# Patient Record
Sex: FEMALE | Race: OTHER | HISPANIC OR LATINO | Employment: UNEMPLOYED | URBAN - METROPOLITAN AREA
[De-identification: names, ages, dates, MRNs, and addresses within clinical notes are randomized per-mention and may not be internally consistent; named-entity substitution may affect disease eponyms.]

---

## 2017-09-11 ENCOUNTER — HISTORICAL (OUTPATIENT)
Dept: RADIOLOGY | Facility: HOSPITAL | Age: 42
End: 2017-09-11

## 2024-02-28 DIAGNOSIS — N85.8 UTERINE MASS: Primary | ICD-10-CM

## 2024-03-10 ENCOUNTER — TELEPHONE (OUTPATIENT)
Dept: GYNECOLOGY | Facility: CLINIC | Age: 49
End: 2024-03-10

## 2024-03-10 DIAGNOSIS — N93.9 ABNORMAL UTERINE BLEEDING (AUB): Primary | ICD-10-CM

## 2024-03-10 RX ORDER — FERROUS GLUCONATE 324(38)MG
324 TABLET ORAL
Qty: 30 TABLET | Refills: 11 | Status: ON HOLD | OUTPATIENT
Start: 2024-03-10 | End: 2024-06-13

## 2024-03-10 RX ORDER — MEDROXYPROGESTERONE ACETATE 10 MG/1
20 TABLET ORAL DAILY
Qty: 60 TABLET | Refills: 11 | Status: ON HOLD | OUTPATIENT
Start: 2024-03-10 | End: 2024-06-13

## 2024-03-10 NOTE — TELEPHONE ENCOUNTER
Patient with h/o menorrhagia. Desires definitive surgical management.  Pateint needs an EMB and pap smear prior to proceeding.  Will request images.  Referral notable for h/H 6.6/24. Pateint reports continued fatigue, denies any SOB, weakness or dizziness at this time. Discussed will start on PO iron. Will get labs to assess for anemia.   Request for visit for follow up sent  Bernarda Ayala MD PGY3  Obstetrics & Gynecology

## 2024-03-11 ENCOUNTER — TELEPHONE (OUTPATIENT)
Dept: GYNECOLOGY | Facility: CLINIC | Age: 49
End: 2024-03-11

## 2024-03-11 NOTE — TELEPHONE ENCOUNTER
----- Message from Bernarda Ayala MD sent at 3/10/2024  4:05 PM CDT -----  Regarding: Planning for hysterectomy  Hello,  I see this patient was referred for fibroids and has anemia.  Can we request her imaging for surgical planning.    Can we get this patient into clinic in the next 1-2 weeks for EMB and pap smear results. She is getting set up for hysterectomy.  Bernarda Ayala MD PGY3  Obstetrics & Gynecology

## 2024-03-12 ENCOUNTER — TELEPHONE (OUTPATIENT)
Dept: GYNECOLOGY | Facility: CLINIC | Age: 49
End: 2024-03-12

## 2024-03-12 NOTE — TELEPHONE ENCOUNTER
I have requested imaging from [facility] on 3/11/24.          ----- Message from Bernarda Ayala MD sent at 3/10/2024  4:05 PM CDT -----  Regarding: Planning for hysterectomy  Hello,  I see this patient was referred for fibroids and has anemia.  Can we request her imaging for surgical planning.    Can we get this patient into clinic in the next 1-2 weeks for EMB and pap smear results. She is getting set up for hysterectomy.  Bernarda Ayala MD PGY3  Obstetrics & Gynecology

## 2024-03-12 NOTE — TELEPHONE ENCOUNTER
She needs EMB and PAP in 1-2 weeks.          ----- Message from Bernarda Ayala MD sent at 3/10/2024  4:05 PM CDT -----  Regarding: Planning for hysterectomy  Hello,  I see this patient was referred for fibroids and has anemia.  Can we request her imaging for surgical planning.    Can we get this patient into clinic in the next 1-2 weeks for EMB and pap smear results. She is getting set up for hysterectomy.  Bernarda Ayala MD PGY3  Obstetrics & Gynecology

## 2024-03-18 ENCOUNTER — OFFICE VISIT (OUTPATIENT)
Dept: GYNECOLOGY | Facility: CLINIC | Age: 49
End: 2024-03-18

## 2024-03-18 ENCOUNTER — LAB VISIT (OUTPATIENT)
Dept: LAB | Facility: HOSPITAL | Age: 49
End: 2024-03-18

## 2024-03-18 VITALS
HEIGHT: 60 IN | WEIGHT: 186.81 LBS | OXYGEN SATURATION: 99 % | HEART RATE: 77 BPM | DIASTOLIC BLOOD PRESSURE: 71 MMHG | BODY MASS INDEX: 36.68 KG/M2 | RESPIRATION RATE: 18 BRPM | SYSTOLIC BLOOD PRESSURE: 115 MMHG | TEMPERATURE: 98 F

## 2024-03-18 DIAGNOSIS — Z11.3 ROUTINE SCREENING FOR STI (SEXUALLY TRANSMITTED INFECTION): Primary | ICD-10-CM

## 2024-03-18 DIAGNOSIS — Z11.3 ROUTINE SCREENING FOR STI (SEXUALLY TRANSMITTED INFECTION): ICD-10-CM

## 2024-03-18 DIAGNOSIS — R19.00 PELVIC MASS: ICD-10-CM

## 2024-03-18 DIAGNOSIS — Z12.4 CERVICAL CANCER SCREENING: ICD-10-CM

## 2024-03-18 DIAGNOSIS — N85.8 UTERINE MASS: ICD-10-CM

## 2024-03-18 DIAGNOSIS — N93.9 ABNORMAL UTERINE BLEEDING (AUB): ICD-10-CM

## 2024-03-18 LAB
ANISOCYTOSIS BLD QL SMEAR: ABNORMAL
B-HCG UR QL: NEGATIVE
BASOPHILS # BLD AUTO: 0.04 X10(3)/MCL
BASOPHILS NFR BLD AUTO: 0.5 %
C TRACH DNA SPEC QL NAA+PROBE: NOT DETECTED
CTP QC/QA: YES
EOSINOPHIL # BLD AUTO: 0.17 X10(3)/MCL (ref 0–0.9)
EOSINOPHIL NFR BLD AUTO: 2.3 %
ERYTHROCYTE [DISTWIDTH] IN BLOOD BY AUTOMATED COUNT: 23.7 % (ref 11.5–17)
HCT VFR BLD AUTO: 23.7 % (ref 37–47)
HGB BLD-MCNC: 6.4 G/DL (ref 12–16)
HIV 1+2 AB+HIV1 P24 AG SERPL QL IA: NONREACTIVE
HYPOCHROMIA BLD QL SMEAR: ABNORMAL
IMM GRANULOCYTES # BLD AUTO: 0.04 X10(3)/MCL (ref 0–0.04)
IMM GRANULOCYTES NFR BLD AUTO: 0.5 %
LYMPHOCYTES # BLD AUTO: 2.36 X10(3)/MCL (ref 0.6–4.6)
LYMPHOCYTES NFR BLD AUTO: 31.6 %
MACROCYTES BLD QL SMEAR: SLIGHT
MCH RBC QN AUTO: 18 PG (ref 27–31)
MCHC RBC AUTO-ENTMCNC: 27 G/DL (ref 33–36)
MCV RBC AUTO: 66.6 FL (ref 80–94)
MICROCYTES BLD QL SMEAR: ABNORMAL
MONOCYTES # BLD AUTO: 0.49 X10(3)/MCL (ref 0.1–1.3)
MONOCYTES NFR BLD AUTO: 6.6 %
N GONORRHOEA DNA SPEC QL NAA+PROBE: NOT DETECTED
NEUTROPHILS # BLD AUTO: 4.38 X10(3)/MCL (ref 2.1–9.2)
NEUTROPHILS NFR BLD AUTO: 58.5 %
NRBC BLD AUTO-RTO: 0.3 %
PLATELET # BLD AUTO: 269 X10(3)/MCL (ref 130–400)
PLATELET # BLD EST: NORMAL 10*3/UL
PMV BLD AUTO: 9.5 FL (ref 7.4–10.4)
RBC # BLD AUTO: 3.56 X10(6)/MCL (ref 4.2–5.4)
RBC MORPH BLD: ABNORMAL
SOURCE (OHS): NORMAL
T PALLIDUM AB SER QL: NONREACTIVE
TSH SERPL-ACNC: 2.58 UIU/ML (ref 0.35–4.94)
WBC # SPEC AUTO: 7.48 X10(3)/MCL (ref 4.5–11.5)

## 2024-03-18 PROCEDURE — 87389 HIV-1 AG W/HIV-1&-2 AB AG IA: CPT

## 2024-03-18 PROCEDURE — 81025 URINE PREGNANCY TEST: CPT | Mod: PBBFAC

## 2024-03-18 PROCEDURE — 58100 BIOPSY OF UTERUS LINING: CPT | Mod: 53,PBBFAC | Performed by: OBSTETRICS & GYNECOLOGY

## 2024-03-18 PROCEDURE — 85025 COMPLETE CBC W/AUTO DIFF WBC: CPT

## 2024-03-18 PROCEDURE — 84443 ASSAY THYROID STIM HORMONE: CPT

## 2024-03-18 PROCEDURE — 99214 OFFICE O/P EST MOD 30 MIN: CPT | Mod: PBBFAC,25

## 2024-03-18 PROCEDURE — 86780 TREPONEMA PALLIDUM: CPT

## 2024-03-18 PROCEDURE — 88174 CYTOPATH C/V AUTO IN FLUID: CPT

## 2024-03-18 PROCEDURE — 87491 CHLMYD TRACH DNA AMP PROBE: CPT

## 2024-03-18 PROCEDURE — 58100 BIOPSY OF UTERUS LINING: CPT | Mod: PBBFAC

## 2024-03-18 PROCEDURE — 87624 HPV HI-RISK TYP POOLED RSLT: CPT

## 2024-03-18 PROCEDURE — 36415 COLL VENOUS BLD VENIPUNCTURE: CPT

## 2024-03-18 PROCEDURE — 99459 PELVIC EXAMINATION: CPT | Mod: PBBFAC

## 2024-03-18 NOTE — PROGRESS NOTES
[facility] GYNECOLOGY CLINIC NOTE     Eileen Clarke is a 48 y.o.  presenting to GYN clinic for pelvic mass and AUB.     Patient reports two years of pelvic mass.   Was referred from [facility]. CT, CXR, and EKG obtained at that hospital.     Taking PO iron and Provera, no bleeding since initiation of Provera.  Reports menses lasting up to 15 day, requires use of 4-5 pads per day, passing clots and bleeding through clothing.   Denies dysuria, hematuria, pain with BM, constipation, diarrhea. Has BM once per day.   Last sexually active one month ago.   Reports intermittent episodes of dizziness, lightheadedness over past month.  Also reports three days of right sided abdominal pressure at night.     Gynecology  OB History          3    Para   3    Term                AB        Living             SAB        IAB        Ectopic        Multiple        Live Births                     x3  Most recent baby born   Denies hx of STIs  Denies hx of abnormal pap    Past Medical History:   Diagnosis Date    Anemia       History reviewed. No pertinent surgical history.   Current Outpatient Medications   Medication Instructions    ferrous gluconate (FERGON) 324 mg, Oral, With breakfast    medroxyPROGESTERone (PROVERA) 20 mg, Oral, Daily     Social History     Tobacco Use    Smoking status: Never    Smokeless tobacco: Never   Substance Use Topics    Drug use: Never       Review of Systems  Pertinent items noted in HPI.     Objective:     Vitals:    24 0803   BP: 115/71   Pulse: 77   Resp: 18   Temp: 97.6 °F (36.4 °C)   SpO2: 99%   Weight: 84.7 kg (186 lb 12.8 oz)   Height: 5' (1.524 m)     Body mass index is 36.48 kg/m².    Physical Exam:   General: Alert and oriented, in no acute distress  Lungs: Breathing comfortably on room air, no conversational dyspnea  Heart: RRR  Abdomen: Soft, non-distended, non tender to palpation.  Extremities: Atraumatic, non-edematous, no cords or calf tenderness, no  significant calf/ankle edema  External genitalia: Normal female genitalia without lesion, discharge or tenderness. Normal appearing urethral meatus. Normal appearing external anus.  Bimanual Exam:  Uterus 20 cm in size, fundus at umbilicus. Irregular in contour. No cervical motion tenderness. No adnexal fullness/tenderness.  Speculum Exam: Vaginal mucosa pink and moist, without lesion. Scant, white discharge present in vaginal canal. Cervix well visualized, smooth in contour with no masses or lesions. Os normal in appearance without blood or discharge.   Note: RN chaperone present for entirety of exam.    Assessment:       48 y.o.  here for AUB, pelvic mass.     1. Routine screening for STI (sexually transmitted infection)  Chlamydia/GC, PCR    HIV 1/2 Ag/Ab (4th Gen)    SYPHILIS ANTIBODY (WITH REFLEX RPR)      2. Uterine mass  Ambulatory referral/consult to Gynecology      3. Abnormal uterine bleeding (AUB)  POCT Urine Pregnancy    Liquid-Based Pap Smear, Screening Screening      4. Pelvic mass        5. Cervical cancer screening               Plan:         Problem List Items Addressed This Visit          Renal/    Abnormal uterine bleeding (AUB)     - Obtain TSH today  - Obtain CBC today, will consider transfusion if indicated  - EMB attempted today, unable to complete secondary to cervical stenosis  - Continue Provera 20 mg daily         Relevant Orders    POCT Urine Pregnancy (Completed)    Liquid-Based Pap Smear, Screening Screening    Cervical cancer screening     - Pap/HPV testing performed today            ID    Routine screening for STI (sexually transmitted infection) - Primary    Relevant Orders    Chlamydia/GC, PCR (Completed)    HIV 1/2 Ag/Ab (4th Gen) (Completed)    SYPHILIS ANTIBODY (WITH REFLEX RPR) (Completed)       GI    Pelvic mass     - 20 cm pelvic mass noted on exam  - CT obtained at [facility], will obtain images and review, consider surgical management           Other Visit  Diagnoses       Uterine mass                Will obtain CT images from outside hospital, [facility] and discuss possible surgical planning with GYN team, determine follow up at that time.     Discussed patient and plan with Dr. Kimball.     Ethel Suarez MD  [facility], PGY-2

## 2024-03-19 ENCOUNTER — TELEPHONE (OUTPATIENT)
Dept: GYNECOLOGY | Facility: CLINIC | Age: 49
End: 2024-03-19

## 2024-03-19 NOTE — PROCEDURES
Endometrial biopsy    Date/Time: 3/18/2024 8:00 AM    Performed by: Ethel Suarez MD  Authorized by: Elle Kimball MD    Consent:     Consent obtained:  Prior to procedure the appropriate consent was completed and verified    Consent given by:  Patient    Patient questions answered: yes      Patient agrees, verbalizes understanding, and wants to proceed: yes    Indication:     Indications: Other disorder of menstruation and other abnormal bleeding from female genital tract    Pre-procedure:     Pre-procedure timeout performed: yes    Procedure:     Procedure: endometrial biopsy with Pipelle      Cervix cleaned and prepped: yes      A paracervical block was performed: no      Uterus sounded: no      Unable to perform due to: cervical stenosis      Ethel Suarez MD  [facility], PGY-2

## 2024-03-19 NOTE — TELEPHONE ENCOUNTER
I've recruited Melany in radiology to look. She will get back with me. ----- Message from Ethel Suarez MD sent at 3/19/2024  1:36 PM CDT -----  Regarding: FW: Planning for hysterectomy  Hey there,  This patient's imaging read was uploaded, but we are not able to view the actual image. Is there a way we can get ahold of CT from [facility]? If not able, patient is also willing to go  CD and bring it to us in person    Thanks!  Ethel Suarez    ----- Message -----  From: Bernarda Ayala MD  Sent: 3/10/2024   4:52 PM CDT  To: Brie Correa LPN; Cheyanne Varela MD; #  Subject: Planning for hysterectomy                        Hello,  I see this patient was referred for fibroids and has anemia.  Can we request her imaging for surgical planning.    Can we get this patient into clinic in the next 1-2 weeks for EMB and pap smear results. She is getting set up for hysterectomy.  Bernarda Ayala MD PGY3  Obstetrics & Gynecology

## 2024-03-19 NOTE — ASSESSMENT & PLAN NOTE
- 20 cm pelvic mass noted on exam  - CT obtained at [facility], will obtain images and review, consider surgical management

## 2024-03-19 NOTE — ASSESSMENT & PLAN NOTE
- Obtain TSH today  - Obtain CBC today, will consider transfusion if indicated  - EMB attempted today, unable to complete secondary to cervical stenosis  - Continue Provera 20 mg daily

## 2024-03-20 LAB — PSYCHE PATHOLOGY RESULT: NORMAL

## 2024-04-01 ENCOUNTER — TELEPHONE (OUTPATIENT)
Dept: GYNECOLOGY | Facility: CLINIC | Age: 49
End: 2024-04-01

## 2024-04-01 NOTE — TELEPHONE ENCOUNTER
Good Afternoon,     The EKG report,   the report of the CT abd/pelvis and chest XR from [facility] (2/21/24) is now scanned into her .      I did receive the DISC and it is currently being uploaded into her chart.

## 2024-04-16 ENCOUNTER — TELEPHONE (OUTPATIENT)
Dept: GYNECOLOGY | Facility: CLINIC | Age: 49
End: 2024-04-16

## 2024-04-16 NOTE — TELEPHONE ENCOUNTER
Patient's daughter called back. Previous EMB attempted on 3/18/24, unsuccessful. Scheduled patient for a follow up visit on 5/8/24 at 1:45 pm. Patient daughter verbalized understanding and will make sure they attend the appt.

## 2024-04-16 NOTE — TELEPHONE ENCOUNTER
Used language line solutions, id #: [id number].     Called patient and ended up getting hold of her daughter that speaks english and Nigerian. Patients daughters asked if she could call back in a couple of minutes. I stated yes and to just ask for me directly. Patient daughter verbalized understanding.

## 2024-04-16 NOTE — TELEPHONE ENCOUNTER
----- Message from Maya Pabon, RN sent at 4/8/2024  7:44 AM CDT -----  Regarding: FW: Patient Call Back  Jacy, this patient needs EMB, for whatever reason she wasn't scheduled for it when she came for her last visit  ----- Message -----  From: Francia Yin  Sent: 4/5/2024   3:48 PM CDT  To: [facility] Gynecology Clinical Support Staff  Subject: Patient Call Back                                The patient above called stating that she never received a call about her visit from 0318/24. Can someone please give her a call. Please advise, Thanks.

## 2024-04-23 ENCOUNTER — TELEPHONE (OUTPATIENT)
Dept: GYNECOLOGY | Facility: CLINIC | Age: 49
End: 2024-04-23

## 2024-04-23 NOTE — TELEPHONE ENCOUNTER
Attempted to reach patient to discuss need for transfusion and surgical planning. Message left with patient's daughter to return call at her earliest convenience.    Fina Amos MD  [facility], PGY-3

## 2024-04-30 ENCOUNTER — TELEPHONE (OUTPATIENT)
Dept: GYNECOLOGY | Facility: CLINIC | Age: 49
End: 2024-04-30

## 2024-04-30 NOTE — TELEPHONE ENCOUNTER
A call was placed to this lady today and instructions/phone numbers were given to her on how to contact our financial department to take care of this.          ----- Message from Fina Amos MD sent at 4/23/2024  9:58 AM CDT -----  Regarding: Insurance  Bonjour!    Someone was attempting to help this patient fill out insurance application paperwork when she was last seen in clinic, but her partner's salary was incorrectly denoted, so she didn't qualify and was unable to get approved. Is there any way that you may be able to assist? She has a very large uterus and is very anemic, so I need to get her a pre-op blood transfusion as well. Can assist with the Espanol here as well :)    Thank you!  Alaina

## 2024-05-08 ENCOUNTER — OFFICE VISIT (OUTPATIENT)
Dept: GYNECOLOGY | Facility: CLINIC | Age: 49
End: 2024-05-08
Payer: MEDICAID

## 2024-05-08 VITALS
RESPIRATION RATE: 18 BRPM | HEART RATE: 74 BPM | DIASTOLIC BLOOD PRESSURE: 69 MMHG | WEIGHT: 183 LBS | BODY MASS INDEX: 33.68 KG/M2 | SYSTOLIC BLOOD PRESSURE: 110 MMHG | HEIGHT: 62 IN | TEMPERATURE: 98 F | OXYGEN SATURATION: 100 %

## 2024-05-08 DIAGNOSIS — N85.8 UTERINE MASS: Primary | ICD-10-CM

## 2024-05-08 DIAGNOSIS — N93.9 ABNORMAL UTERINE BLEEDING (AUB): ICD-10-CM

## 2024-05-08 LAB
B-HCG UR QL: NEGATIVE
CTP QC/QA: YES

## 2024-05-08 PROCEDURE — 58100 BIOPSY OF UTERUS LINING: CPT | Mod: PBBFAC

## 2024-05-08 PROCEDURE — 58100 BIOPSY OF UTERUS LINING: CPT | Mod: PBBFAC | Performed by: OBSTETRICS & GYNECOLOGY

## 2024-05-08 PROCEDURE — 99213 OFFICE O/P EST LOW 20 MIN: CPT | Mod: PBBFAC,25

## 2024-05-08 PROCEDURE — 81025 URINE PREGNANCY TEST: CPT | Mod: PBBFAC

## 2024-05-08 PROCEDURE — 96372 THER/PROPH/DIAG INJ SC/IM: CPT | Mod: PBBFAC

## 2024-05-08 RX ORDER — KETOROLAC TROMETHAMINE 30 MG/ML
30 INJECTION, SOLUTION INTRAMUSCULAR; INTRAVENOUS ONCE
Status: DISCONTINUED | OUTPATIENT
Start: 2024-05-08 | End: 2024-05-08

## 2024-05-08 RX ORDER — KETOROLAC TROMETHAMINE 30 MG/ML
30 INJECTION, SOLUTION INTRAMUSCULAR; INTRAVENOUS ONCE
Status: COMPLETED | OUTPATIENT
Start: 2024-05-08 | End: 2024-05-08

## 2024-05-08 RX ORDER — MISOPROSTOL 200 UG/1
200 TABLET ORAL
Status: COMPLETED | OUTPATIENT
Start: 2024-05-08 | End: 2024-05-08

## 2024-05-08 RX ADMIN — MISOPROSTOL 200 MCG: 200 TABLET ORAL at 02:05

## 2024-05-08 RX ADMIN — KETOROLAC TROMETHAMINE 30 MG: 30 INJECTION INTRAMUSCULAR; INTRAVENOUS at 02:05

## 2024-05-08 NOTE — PROGRESS NOTES
"[facility] GYNECOLOGY CLINIC NOTE     Eileen Clarke is a 48 y.o.  presenting to GYN clinic for heavy bleeding. She has a medical history of anemia.   Has bled every day this month,having to change pads 3 times a day.     Experiencing palpitations 1-2 times daily. Denies lightheadedness, chest pain, and dizziness.     LMP- , goes through 5 pads a day on period  Last Pap 3/20/24- negative for intraepithelial lesion or malignancy   EMB 3/18/24- unsuccessful    Patient denies abnormal discharge, pelvic pain, abdominal pain, vulvar rash or skin changes, dysuria, dyspareunia. Sexually active.  Denies family history of breast, ovarian, endometrial cancers.     Gynecology  OB History          3    Para   3    Term                AB        Living             SAB        IAB        Ectopic        Multiple        Live Births                    Past Medical History:   Diagnosis Date    Anemia       History reviewed. No pertinent surgical history.   Current Outpatient Medications   Medication Instructions    ferrous gluconate (FERGON) 324 mg, Oral, With breakfast    medroxyPROGESTERone (PROVERA) 20 mg, Oral, Daily     Social History     Tobacco Use    Smoking status: Never    Smokeless tobacco: Never   Substance Use Topics    Drug use: Never       Review of Systems  Pertinent items noted in HPI.    Objective:     Vitals:    24 1350   BP: 110/69   BP Location: Right arm   Patient Position: Sitting   BP Method: Large (Automatic)   Pulse: 74   Resp: 18   Temp: 98 °F (36.7 °C)   TempSrc: Oral   SpO2: 100%   Weight: 83 kg (183 lb)   Height: 5' 2" (1.575 m)     Body mass index is 33.47 kg/m².    Physical Exam:   General: Alert and oriented, in no acute distress  Lungs: Breathing comfortably on room air, no conversational dyspnea  Heart: Regular rate, extremities well perfused  Abdomen: Soft, non-distended, non tender to palpation, no involuntary guarding, no rebound tenderness  Extremities: Atraumatic, " non-edematous, no cords or calf tenderness, no significant calf/ankle edema  External genitalia: Normal female genitalia without lesion, discharge or tenderness. Normal appearing urethral meatus. Normal appearing external anus.  Speculum Exam: Vaginal mucosa pink and moist, without lesion. Scant, white discharge present in vaginal canal. Cervix well visualized, smooth in contour with no masses or lesions. Os multiparous in appearance without blood or discharge.   Note:  Female nurse chaperone present for entirety of exam.    Relevant Labs:   Lab Results   Component Value Date    WBC 7.48 2024    HGB 6.4 (L) 2024    HCT 23.7 (L) 2024    MCV 66.6 (L) 2024     2024         Assessment:       48 y.o.  here for heavy menstrual and intermenstrual bleeding.    1. Uterine mass        2. Abnormal uterine bleeding (AUB)  POCT Urine Pregnancy    Specimen to Pathology Gynecology and Obstetrics    CBC Without Differential               Plan:         Problem List Items Addressed This Visit          Renal/    Abnormal uterine bleeding (AUB)     EMB performed today.   Patient in process of being pre-op'd for hysterectomy in setting of AUB-L.            Relevant Orders    POCT Urine Pregnancy (Completed)    Specimen to Pathology Gynecology and Obstetrics    CBC Without Differential     Other Visit Diagnoses       Uterine mass    -  Primary            Return to clinic for pre-operative visit    Discussed patient and plan with Dr. Kimball.      Abimbola Alfaro MD  [facility] - PGY-2  10:19 PM 2024

## 2024-05-09 ENCOUNTER — TELEPHONE (OUTPATIENT)
Dept: OBSTETRICS AND GYNECOLOGY | Facility: HOSPITAL | Age: 49
End: 2024-05-09
Payer: MEDICAID

## 2024-05-09 PROCEDURE — 88305 TISSUE EXAM BY PATHOLOGIST: CPT | Mod: TC

## 2024-05-09 NOTE — TELEPHONE ENCOUNTER
Patient came in and signed the Paco consent and was emailed to Briseida Brito (the consent was in German and I had Yun communicating for me)

## 2024-05-09 NOTE — ASSESSMENT & PLAN NOTE
EMB performed today.   Patient in process of being pre-op'd for hysterectomy in setting of AUB-L.

## 2024-05-09 NOTE — PROCEDURES
Endometrial biopsy    Date/Time: 5/8/2024 1:45 PM    Performed by: Abimbola Alfaro MD  Authorized by: Abimbola Alfaro MD    Consent:     Consent obtained:  Prior to procedure the appropriate consent was completed and verified    Consent given by:  Patient    Patient questions answered: yes      Patient agrees, verbalizes understanding, and wants to proceed: yes      Instructions and paperwork completed: yes    Indication:     Indications: Other disorder of menstruation and other abnormal bleeding from female genital tract    Pre-procedure:     Pre-procedure timeout performed: yes    Procedure:     Procedure: endometrial biopsy with Pipelle      Cervix cleaned and prepped: yes      A paracervical block was performed: no      An intracervical block was performed: no      The cervix was dilated: no      Uterus sounded: yes      Uterus sound depth (cm):  12    Curettes used:  2    Specimen collected: specimen collected and sent to pathology      Patient tolerated procedure well with no complications: yes    Comments:     Procedure comments:  4 passes made.  Endometrial cavity deviated to left.    Abimbola Alfaro MD  [facility] - PGY-2  10:22 PM 05/08/2024

## 2024-05-14 ENCOUNTER — TELEPHONE (OUTPATIENT)
Dept: GYNECOLOGY | Facility: CLINIC | Age: 49
End: 2024-05-14
Payer: MEDICAID

## 2024-05-14 DIAGNOSIS — N93.9 ABNORMAL UTERINE BLEEDING (AUB): Primary | ICD-10-CM

## 2024-05-14 DIAGNOSIS — R19.00 PELVIC MASS: ICD-10-CM

## 2024-05-14 NOTE — TELEPHONE ENCOUNTER
Called patient to discuss surgical planning for THALIA/BS/Cystoscopy for AUB-L with associated anemia, requiring blood transfusion. Patient amenable to proceeding with surgery on 06/13/24; case request submitted. Given that most recent H/H was 6.4/23.7, anticipate that she will require pre-op blood transfusion. Will coordinate with [facility] center so that T&S and CBC can be obtained on day of pre-op appointment (05/29/24) and patient can return the following day for blood transfusion.    Fina Amos MD  [facility], PGY-3

## 2024-05-15 LAB
ESTROGEN SERPL-MCNC: NORMAL PG/ML
INSULIN SERPL-ACNC: NORMAL U[IU]/ML
LAB AP CLINICAL INFORMATION: NORMAL
LAB AP GROSS DESCRIPTION: NORMAL
LAB AP REPORT FOOTNOTES: NORMAL

## 2024-05-19 ENCOUNTER — TELEPHONE (OUTPATIENT)
Dept: GYNECOLOGY | Facility: CLINIC | Age: 49
End: 2024-05-19
Payer: MEDICAID

## 2024-05-19 NOTE — TELEPHONE ENCOUNTER
----- Message from Fina Amos MD sent at 5/14/2024  2:28 PM CDT -----  I will touch base with our friends at the infusion center to coordinate. Thank you!

## 2024-05-22 ENCOUNTER — TELEPHONE (OUTPATIENT)
Dept: GYNECOLOGY | Facility: CLINIC | Age: 49
End: 2024-05-22
Payer: MEDICAID

## 2024-05-22 NOTE — TELEPHONE ENCOUNTER
She will get in touch with case managment about the chris that patient is receiving for surgery. Dr Kimball, residents and Brie notified

## 2024-05-28 ENCOUNTER — ANESTHESIA EVENT (OUTPATIENT)
Dept: SURGERY | Facility: HOSPITAL | Age: 49
End: 2024-05-28
Payer: MEDICAID

## 2024-05-28 NOTE — ANESTHESIA PREPROCEDURE EVALUATION
Eileen Clarke is a 48 y.o. female PRESENTING FOR HYSTERECTOMY, TOTAL, ABDOMINAL, SALPINGECTOMY, WITH CYSTOSCOPY with a history of   -AUB  -UTERINE MASS    -ANEMIA REQUIRING BLOOD TRANSFUSION (H/H 6/23 3/18/24)  -OBESITY    BETA-BLOCKER: NONE    New Orders for Anesthesia: UPT, CBC (ONLY IF NOT REPEATED BY GYN FROM MARCH ASSESSMENT)    Patient Active Problem List   Diagnosis    Abnormal uterine bleeding (AUB)    Pelvic mass    Cervical cancer screening    Routine screening for STI (sexually transmitted infection)       Pre-op Assessment    I have reviewed the NPO Status.      Review of Systems  Anesthesia Hx:  No problems with previous Anesthesia                Social:  Non-Smoker       Hematology/Oncology:       -- Anemia:                                  Cardiovascular:  Cardiovascular Normal                                            Pulmonary:  Pulmonary Normal                       Renal/:  Renal/ Normal                 Hepatic/GI:  Hepatic/GI Normal                 Neurological:  Neurology Normal                                      Endocrine:        Obesity / BMI > 30    Vitals:    06/13/24 0508 06/13/24 0510 06/13/24 0522 06/13/24 0615   BP:  128/76  122/71   Pulse:  68  72   Resp: 18  18 20   Temp:  36.6 °C (97.9 °F)  36 °C (96.8 °F)   TempSrc:  Oral  Temporal   SpO2:  99%  100%   Weight: 81.7 kg (180 lb 3.2 oz)            Physical Exam  General: Alert, Cooperative and Well nourished    Airway:  Mallampati: II   Mouth Opening: Normal  TM Distance: Normal  Tongue: Normal  Neck ROM: Normal ROM    Dental:  Intact    Chest/Lungs:  Clear to auscultation, Normal Respiratory Rate    Heart:  Rate: Normal  Rhythm: Regular Rhythm  Sounds: Normal       Latest Reference Range & Units 06/13/24 05:14   hCG Qualitative, Urine Negative  Negative     Lab Results   Component Value Date    WBC 8.91 06/13/2024    HGB 11.2 (L) 06/13/2024    HCT 37.0 06/13/2024    MCV 77.7 (L) 06/13/2024     06/13/2024              Anesthesia Plan  Type of Anesthesia, risks & benefits discussed:    Anesthesia Type: Gen ETT  Intra-op Monitoring Plan: Standard ASA Monitors  Post Op Pain Control Plan: multimodal analgesia  Induction:  IV  Airway Plan: Direct  Informed Consent: Informed consent signed with the Patient and all parties understand the risks and agree with anesthesia plan.  All questions answered.   ASA Score: 2  Day of Surgery Review of History & Physical: H&P Update referred to the surgeon/provider.    Ready For Surgery From Anesthesia Perspective.     .

## 2024-05-30 ENCOUNTER — TELEPHONE (OUTPATIENT)
Dept: GYNECOLOGY | Facility: CLINIC | Age: 49
End: 2024-05-30
Payer: MEDICAID

## 2024-05-30 NOTE — TELEPHONE ENCOUNTER
Patient's daughter called and stated that her mom received a call form [facility]. Patient was very confuse because they said something about a payment or clarification and they need more information for her chart. Please advise. Thanks!

## 2024-06-04 NOTE — H&P (VIEW-ONLY)
"[facility] Preoperative Visit History and Physical    Citizen of [place] Int: [id number]    HPI:   48 y.o.  with a history of AUB-L, large fibroid uterus (20cm uterus), menorrhagia, anemia (last H/H 6.4/23.7) here for surgical discussion. Currently managed with PO Iron, Provera 20mg daily. Having some bulk sx's, intermittent pressure and back pain. Still having regular BM's and voids. Denies LH, dizziness, CP, SOB, abd pain, dysuria, hematuria, constipation.     Past Medical History:   Diagnosis Date    Anemia     Class 1 obesity     BMI 33     History reviewed. No pertinent surgical history.    Family History   Problem Relation Name Age of Onset    Diabetes Mother       OB History    Para Term  AB Living   3 3           SAB IAB Ectopic Multiple Live Births                  # Outcome Date GA Lbr Jose/2nd Weight Sex Type Anes PTL Lv   3 Para      Vag-Spont      2 Para      Vag-Spont      1 Para      Vag-Spont        Gyn Hx:  14/R/15 days, heavy  Contraception: none  Denies h/o STI's, abnormal paps  Last pap (3/2024) NILM, HPV neg    Social History     Socioeconomic History    Marital status:    Tobacco Use    Smoking status: Never    Smokeless tobacco: Never   Substance and Sexual Activity    Alcohol use: Never    Drug use: Never    Sexual activity: Yes     Partners: Male     Birth control/protection: None     Lives with: . Daughter also will come to help  Work: none    Review of patient's allergies indicates:  No Known Allergies    Current Outpatient Medications   Medication Instructions    ferrous gluconate (FERGON) 324 mg, Oral, With breakfast    medroxyPROGESTERone (PROVERA) 20 mg, Oral, Daily     Review of Systems: As stated in HPI.      Objective:     Vitals:    24 0830   BP: 107/68   Pulse: 74   Resp: 12   Temp: 97.9 °F (36.6 °C)   SpO2: 100%   Weight: 83.2 kg (183 lb 6.4 oz)   Height: 5' 2" (1.575 m)     Body mass index is 33.54 kg/m².    PHYSICAL EXAM  GEN: NAD, A&O x3  CV: RRR "   LUNGS: CTABL  ABDOMEN: soft, NTND, no masses    Pelvic Exam (3/2024 by Dr. Suarez)  External genitalia: Normal female genitalia without lesion, discharge or tenderness. Normal appearing urethral meatus. Normal appearing external anus.  Bimanual Exam:  Uterus 20 cm in size, fundus at umbilicus. Irregular in contour. No cervical motion tenderness. No adnexal fullness/tenderness.  Speculum Exam: Vaginal mucosa pink and moist, without lesion. Scant, white discharge present in vaginal canal. Cervix well visualized, smooth in contour with no masses or lesions. Os normal in appearance without blood or discharge.    LABS:  Lab Results   Component Value Date    WBC 7.54 2024    HGB 10.9 (L) 2024    HCT 37.1 2024    MCV 76.7 (L) 2024     2024     Lab Results   Component Value Date    TSH 2.576 2024     EMB (2024): scant benign endometrium with stromal breakdown     IMAGING:  CT A&P (2024)      Assessment:      48 y.o.  for definitive surgical management of AUB-L, menorrhagia, and enlarged uterus (20cm)     Plan:     Counseling: Alternatives to this planned procedure were explained to the patient including expectant, medical and other types of surgical management. This procedure and its risks, reasons, benefits and complications (including injury to bowel, bladder, major blood vessel, ureter, bleeding, possibility of transfusion, infection, scarring, dyspareunia, erosion, further surgery, worsening incontinence, failure of the procedure or fistula formation) were reviewed in detail.    We have reviewed the typical perioperative course with hospital stay, and post-operative precautions and restrictions have been reviewed.    Additionally, ovarian conservation versus elective risk reducing ovarian resection were discussed with the patient.  She understands the risk for reoperation for ovarian etiology to be 5-10%, the risk for ovarian cancer in women to be 1 in 70, and the  protective effects of ovarian hormones including cardiovascular and bone health.  After discussion, the patient desires ovarian conservation with the understanding that if any disease is suspected or bleeding that necessitates it - they may be removed.   Pt counseled on the risks related to occult Leiomyosarcoma (1 in 770 to 1 in 10,000 based on recent ACOG publication). Pt verbalized understanding.   Patient counseled on the fact that cystotomy complicates approximately 0.3 to 11/1000 benign gynecologic surgeries, especially urogynecologic procedures and hysterectomy.  Patient is aware that, in the event of cystotomy, continuous bladder drainage will be continued for 7-10 days with Conner catheter in place.   Counseled on ureteral injury rates of 0.2-7.3%, bowel injury rates of <1%.   Iron Deficiency Anemia: Transfusion of blood and blood products discussed. Patient was consented for blood transfusion in the case of an emergency.  She understands the possibility of blood transfusion reaction and the attendant risk of transmission of HIV & Hepatitis C to be 1 in 2 million and the risk of Hepatitis B to be 1 in 200,000.  She is aware of the possibility of transfusion reaction. All questions were answered. T&S, CBC today, may need preop transfusion.  Patient understands we are affiliated with a teaching institution and residents and medical students will be involved in her care.  She has consented to an exam under anesthesia and understands that medical students participate in this portion of the procedure.  All questions were answered.    Preop testing ordered. Surgical consents signed.  Instructions reviewed, including NPO after midnight.   Counseled to hold the following medications on the day of surgery: per anesthesia  Current contraception: none    To OR for THALIA/BS/Cysto with Dr. Barrera   Scheduled on 6/13/2024    Discussed with Dr. Provost Dino Zuleta MD  [facility]-PGY3  06/05/2024 9:12  AM

## 2024-06-04 NOTE — PROGRESS NOTES
"[facility] Gy[facility] Preoperative Visit History and Physical    [place] Int: [id number]    HPI:   48 y.o.  with a history of AUB-L, large fibroid uterus (20cm uterus), menorrhagia, anemia (last H/H 6.4/23.7) here for surgical discussion. Currently managed with PO Iron, Provera 20mg daily. Having some bulk sx's, intermittent pressure and back pain. Still having regular BM's and voids. Denies LH, dizziness, CP, SOB, abd pain, dysuria, hematuria, constipation.     Past Medical History:   Diagnosis Date    Anemia     Class 1 obesity     BMI 33     History reviewed. No pertinent surgical history.    Family History   Problem Relation Name Age of Onset    Diabetes Mother       OB History    Para Term  AB Living   3 3           SAB IAB Ectopic Multiple Live Births                  # Outcome Date GA Lbr Jose/2nd Weight Sex Type Anes PTL Lv   3 Para      Vag-Spont      2 Para      Vag-Spont      1 Para      Vag-Spont        Gyn Hx:  14/R/15 days, heavy  Contraception: none  Denies h/o STI's, abnormal paps  Last pap (3/2024) NILM, HPV neg    Social History     Socioeconomic History    Marital status:    Tobacco Use    Smoking status: Never    Smokeless tobacco: Never   Substance and Sexual Activity    Alcohol use: Never    Drug use: Never    Sexual activity: Yes     Partners: Male     Birth control/protection: None     Lives with: . Daughter also will come to help  Work: none    Review of patient's allergies indicates:  No Known Allergies    Current Outpatient Medications   Medication Instructions    ferrous gluconate (FERGON) 324 mg, Oral, With breakfast    medroxyPROGESTERone (PROVERA) 20 mg, Oral, Daily     Review of Systems: As stated in HPI.      Objective:     Vitals:    24 0830   BP: 107/68   Pulse: 74   Resp: 12   Temp: 97.9 °F (36.6 °C)   SpO2: 100%   Weight: 83.2 kg (183 lb 6.4 oz)   Height: 5' 2" (1.575 m)     Body mass index is 33.54 kg/m².    PHYSICAL EXAM  GEN: NAD, A&O x3  CV: RRR "   LUNGS: CTABL  ABDOMEN: soft, NTND, no masses    Pelvic Exam (3/2024 by Dr. Suarez)  External genitalia: Normal female genitalia without lesion, discharge or tenderness. Normal appearing urethral meatus. Normal appearing external anus.  Bimanual Exam:  Uterus 20 cm in size, fundus at umbilicus. Irregular in contour. No cervical motion tenderness. No adnexal fullness/tenderness.  Speculum Exam: Vaginal mucosa pink and moist, without lesion. Scant, white discharge present in vaginal canal. Cervix well visualized, smooth in contour with no masses or lesions. Os normal in appearance without blood or discharge.    LABS:  Lab Results   Component Value Date    WBC 7.54 2024    HGB 10.9 (L) 2024    HCT 37.1 2024    MCV 76.7 (L) 2024     2024     Lab Results   Component Value Date    TSH 2.576 2024     EMB (2024): scant benign endometrium with stromal breakdown     IMAGING:  CT A&P (2024)      Assessment:      48 y.o.  for definitive surgical management of AUB-L, menorrhagia, and enlarged uterus (20cm)     Plan:     Counseling: Alternatives to this planned procedure were explained to the patient including expectant, medical and other types of surgical management. This procedure and its risks, reasons, benefits and complications (including injury to bowel, bladder, major blood vessel, ureter, bleeding, possibility of transfusion, infection, scarring, dyspareunia, erosion, further surgery, worsening incontinence, failure of the procedure or fistula formation) were reviewed in detail.    We have reviewed the typical perioperative course with hospital stay, and post-operative precautions and restrictions have been reviewed.    Additionally, ovarian conservation versus elective risk reducing ovarian resection were discussed with the patient.  She understands the risk for reoperation for ovarian etiology to be 5-10%, the risk for ovarian cancer in women to be 1 in 70, and the  protective effects of ovarian hormones including cardiovascular and bone health.  After discussion, the patient desires ovarian conservation with the understanding that if any disease is suspected or bleeding that necessitates it - they may be removed.   Pt counseled on the risks related to occult Leiomyosarcoma (1 in 770 to 1 in 10,000 based on recent ACOG publication). Pt verbalized understanding.   Patient counseled on the fact that cystotomy complicates approximately 0.3 to 11/1000 benign gynecologic surgeries, especially urogynecologic procedures and hysterectomy.  Patient is aware that, in the event of cystotomy, continuous bladder drainage will be continued for 7-10 days with Conner catheter in place.   Counseled on ureteral injury rates of 0.2-7.3%, bowel injury rates of <1%.   Iron Deficiency Anemia: Transfusion of blood and blood products discussed. Patient was consented for blood transfusion in the case of an emergency.  She understands the possibility of blood transfusion reaction and the attendant risk of transmission of HIV & Hepatitis C to be 1 in 2 million and the risk of Hepatitis B to be 1 in 200,000.  She is aware of the possibility of transfusion reaction. All questions were answered. T&S, CBC today, may need preop transfusion.  Patient understands we are affiliated with a teaching institution and residents and medical students will be involved in her care.  She has consented to an exam under anesthesia and understands that medical students participate in this portion of the procedure.  All questions were answered.    Preop testing ordered. Surgical consents signed.  Instructions reviewed, including NPO after midnight.   Counseled to hold the following medications on the day of surgery: per anesthesia  Current contraception: none    To OR for THALIA/BS/Cysto with Dr. Barrera   Scheduled on 6/13/2024    Discussed with Dr. Provost Dino Zuleta MD  [facility]-PGY3  06/05/2024 9:12  AM

## 2024-06-05 ENCOUNTER — OFFICE VISIT (OUTPATIENT)
Dept: GYNECOLOGY | Facility: CLINIC | Age: 49
End: 2024-06-05
Payer: MEDICAID

## 2024-06-05 ENCOUNTER — LAB VISIT (OUTPATIENT)
Dept: LAB | Facility: HOSPITAL | Age: 49
End: 2024-06-05
Payer: MEDICAID

## 2024-06-05 VITALS
BODY MASS INDEX: 33.75 KG/M2 | HEIGHT: 62 IN | HEART RATE: 74 BPM | OXYGEN SATURATION: 100 % | WEIGHT: 183.38 LBS | DIASTOLIC BLOOD PRESSURE: 68 MMHG | SYSTOLIC BLOOD PRESSURE: 107 MMHG | TEMPERATURE: 98 F | RESPIRATION RATE: 12 BRPM

## 2024-06-05 DIAGNOSIS — N93.9 ABNORMAL UTERINE BLEEDING (AUB): ICD-10-CM

## 2024-06-05 DIAGNOSIS — N93.9 ABNORMAL UTERINE BLEEDING (AUB): Primary | ICD-10-CM

## 2024-06-05 DIAGNOSIS — E66.9 CLASS 1 OBESITY WITH BODY MASS INDEX (BMI) OF 33.0 TO 33.9 IN ADULT, UNSPECIFIED OBESITY TYPE, UNSPECIFIED WHETHER SERIOUS COMORBIDITY PRESENT: ICD-10-CM

## 2024-06-05 PROBLEM — E66.811 CLASS 1 OBESITY WITH BODY MASS INDEX (BMI) OF 33.0 TO 33.9 IN ADULT: Status: ACTIVE | Noted: 2024-06-05

## 2024-06-05 LAB
ERYTHROCYTE [DISTWIDTH] IN BLOOD BY AUTOMATED COUNT: 20.6 % (ref 11.5–17)
GROUP & RH: NORMAL
HCT VFR BLD AUTO: 37.1 % (ref 37–47)
HGB BLD-MCNC: 10.9 G/DL (ref 12–16)
INDIRECT COOMBS: NORMAL
MCH RBC QN AUTO: 22.5 PG (ref 27–31)
MCHC RBC AUTO-ENTMCNC: 29.4 G/DL (ref 33–36)
MCV RBC AUTO: 76.7 FL (ref 80–94)
NRBC BLD AUTO-RTO: 0 %
PLATELET # BLD AUTO: 263 X10(3)/MCL (ref 130–400)
PMV BLD AUTO: 9.2 FL (ref 7.4–10.4)
RBC # BLD AUTO: 4.84 X10(6)/MCL (ref 4.2–5.4)
SPECIMEN OUTDATE: NORMAL
WBC # SPEC AUTO: 7.54 X10(3)/MCL (ref 4.5–11.5)

## 2024-06-05 PROCEDURE — 99213 OFFICE O/P EST LOW 20 MIN: CPT | Mod: PBBFAC

## 2024-06-05 PROCEDURE — 85027 COMPLETE CBC AUTOMATED: CPT

## 2024-06-05 PROCEDURE — 36415 COLL VENOUS BLD VENIPUNCTURE: CPT

## 2024-06-05 PROCEDURE — 86850 RBC ANTIBODY SCREEN: CPT

## 2024-06-11 RX ORDER — ACETAMINOPHEN 500 MG
1000 TABLET ORAL
Status: CANCELLED | OUTPATIENT
Start: 2024-06-11 | End: 2024-06-11

## 2024-06-11 RX ORDER — GABAPENTIN 400 MG/1
400 CAPSULE ORAL
Status: CANCELLED | OUTPATIENT
Start: 2024-06-11 | End: 2024-06-11

## 2024-06-13 ENCOUNTER — HOSPITAL ENCOUNTER (INPATIENT)
Facility: HOSPITAL | Age: 49
LOS: 3 days | Discharge: HOME | DRG: 742 | End: 2024-06-16
Attending: OBSTETRICS & GYNECOLOGY | Admitting: OBSTETRICS & GYNECOLOGY
Payer: MEDICAID

## 2024-06-13 ENCOUNTER — ANESTHESIA (OUTPATIENT)
Dept: SURGERY | Facility: HOSPITAL | Age: 49
End: 2024-06-13
Payer: MEDICAID

## 2024-06-13 DIAGNOSIS — R19.00 PELVIC MASS: ICD-10-CM

## 2024-06-13 DIAGNOSIS — D25.9 FIBROID UTERUS: ICD-10-CM

## 2024-06-13 DIAGNOSIS — N93.9 ABNORMAL UTERINE BLEEDING (AUB): ICD-10-CM

## 2024-06-13 DIAGNOSIS — Z90.710 STATUS POST TOTAL ABDOMINAL HYSTERECTOMY: Primary | ICD-10-CM

## 2024-06-13 LAB
B-HCG UR QL: NEGATIVE
BASOPHILS # BLD AUTO: 0.02 X10(3)/MCL
BASOPHILS NFR BLD AUTO: 0.1 %
CTP QC/QA: YES
EOSINOPHIL # BLD AUTO: 0 X10(3)/MCL (ref 0–0.9)
EOSINOPHIL NFR BLD AUTO: 0 %
ERYTHROCYTE [DISTWIDTH] IN BLOOD BY AUTOMATED COUNT: 20.6 % (ref 11.5–17)
ERYTHROCYTE [DISTWIDTH] IN BLOOD BY AUTOMATED COUNT: 21 % (ref 11.5–17)
GROUP & RH: NORMAL
HCT VFR BLD AUTO: 33.5 % (ref 37–47)
HCT VFR BLD AUTO: 37 % (ref 37–47)
HGB BLD-MCNC: 11.2 G/DL (ref 12–16)
HGB BLD-MCNC: 9.8 G/DL (ref 12–16)
IMM GRANULOCYTES # BLD AUTO: 0.32 X10(3)/MCL (ref 0–0.04)
IMM GRANULOCYTES NFR BLD AUTO: 1.9 %
INDIRECT COOMBS: NORMAL
LYMPHOCYTES # BLD AUTO: 1.16 X10(3)/MCL (ref 0.6–4.6)
LYMPHOCYTES NFR BLD AUTO: 7.1 %
MCH RBC QN AUTO: 23.4 PG (ref 27–31)
MCH RBC QN AUTO: 23.5 PG (ref 27–31)
MCHC RBC AUTO-ENTMCNC: 29.3 G/DL (ref 33–36)
MCHC RBC AUTO-ENTMCNC: 30.3 G/DL (ref 33–36)
MCV RBC AUTO: 77.7 FL (ref 80–94)
MCV RBC AUTO: 80 FL (ref 80–94)
MONOCYTES # BLD AUTO: 0.62 X10(3)/MCL (ref 0.1–1.3)
MONOCYTES NFR BLD AUTO: 3.8 %
NEUTROPHILS # BLD AUTO: 14.32 X10(3)/MCL (ref 2.1–9.2)
NEUTROPHILS NFR BLD AUTO: 87.1 %
NRBC BLD AUTO-RTO: 0 %
NRBC BLD AUTO-RTO: 0 %
PLATELET # BLD AUTO: 196 X10(3)/MCL (ref 130–400)
PLATELET # BLD AUTO: 230 X10(3)/MCL (ref 130–400)
PMV BLD AUTO: 8.7 FL (ref 7.4–10.4)
PMV BLD AUTO: 9 FL (ref 7.4–10.4)
RBC # BLD AUTO: 4.19 X10(6)/MCL (ref 4.2–5.4)
RBC # BLD AUTO: 4.76 X10(6)/MCL (ref 4.2–5.4)
SPECIMEN OUTDATE: NORMAL
WBC # SPEC AUTO: 16.44 X10(3)/MCL (ref 4.5–11.5)
WBC # SPEC AUTO: 8.91 X10(3)/MCL (ref 4.5–11.5)

## 2024-06-13 PROCEDURE — 88307 TISSUE EXAM BY PATHOLOGIST: CPT | Mod: TC | Performed by: STUDENT IN AN ORGANIZED HEALTH CARE EDUCATION/TRAINING PROGRAM

## 2024-06-13 PROCEDURE — 63600175 PHARM REV CODE 636 W HCPCS

## 2024-06-13 PROCEDURE — 81025 URINE PREGNANCY TEST: CPT

## 2024-06-13 PROCEDURE — 37000009 HC ANESTHESIA EA ADD 15 MINS: Performed by: STUDENT IN AN ORGANIZED HEALTH CARE EDUCATION/TRAINING PROGRAM

## 2024-06-13 PROCEDURE — 99900035 HC TECH TIME PER 15 MIN (STAT)

## 2024-06-13 PROCEDURE — 63600175 PHARM REV CODE 636 W HCPCS: Performed by: ANESTHESIOLOGY

## 2024-06-13 PROCEDURE — C2617 STENT, NON-COR, TEM W/O DEL: HCPCS | Performed by: STUDENT IN AN ORGANIZED HEALTH CARE EDUCATION/TRAINING PROGRAM

## 2024-06-13 PROCEDURE — 25000003 PHARM REV CODE 250

## 2024-06-13 PROCEDURE — 11000001 HC ACUTE MED/SURG PRIVATE ROOM

## 2024-06-13 PROCEDURE — 36000709 HC OR TIME LEV III EA ADD 15 MIN: Performed by: STUDENT IN AN ORGANIZED HEALTH CARE EDUCATION/TRAINING PROGRAM

## 2024-06-13 PROCEDURE — 86900 BLOOD TYPING SEROLOGIC ABO: CPT

## 2024-06-13 PROCEDURE — C1769 GUIDE WIRE: HCPCS | Performed by: STUDENT IN AN ORGANIZED HEALTH CARE EDUCATION/TRAINING PROGRAM

## 2024-06-13 PROCEDURE — 37000008 HC ANESTHESIA 1ST 15 MINUTES: Performed by: STUDENT IN AN ORGANIZED HEALTH CARE EDUCATION/TRAINING PROGRAM

## 2024-06-13 PROCEDURE — 63600175 PHARM REV CODE 636 W HCPCS: Performed by: NURSE ANESTHETIST, CERTIFIED REGISTERED

## 2024-06-13 PROCEDURE — 27201423 OPTIME MED/SURG SUP & DEVICES STERILE SUPPLY: Performed by: STUDENT IN AN ORGANIZED HEALTH CARE EDUCATION/TRAINING PROGRAM

## 2024-06-13 PROCEDURE — 25000003 PHARM REV CODE 250: Performed by: NURSE ANESTHETIST, CERTIFIED REGISTERED

## 2024-06-13 PROCEDURE — C1758 CATHETER, URETERAL: HCPCS | Performed by: STUDENT IN AN ORGANIZED HEALTH CARE EDUCATION/TRAINING PROGRAM

## 2024-06-13 PROCEDURE — 71000033 HC RECOVERY, INTIAL HOUR: Performed by: STUDENT IN AN ORGANIZED HEALTH CARE EDUCATION/TRAINING PROGRAM

## 2024-06-13 PROCEDURE — 0UT70ZZ RESECTION OF BILATERAL FALLOPIAN TUBES, OPEN APPROACH: ICD-10-PCS | Performed by: STUDENT IN AN ORGANIZED HEALTH CARE EDUCATION/TRAINING PROGRAM

## 2024-06-13 PROCEDURE — 25500020 PHARM REV CODE 255

## 2024-06-13 PROCEDURE — 85025 COMPLETE CBC W/AUTO DIFF WBC: CPT

## 2024-06-13 PROCEDURE — 86901 BLOOD TYPING SEROLOGIC RH(D): CPT

## 2024-06-13 PROCEDURE — 86923 COMPATIBILITY TEST ELECTRIC: CPT | Mod: 91 | Performed by: STUDENT IN AN ORGANIZED HEALTH CARE EDUCATION/TRAINING PROGRAM

## 2024-06-13 PROCEDURE — 85027 COMPLETE CBC AUTOMATED: CPT | Performed by: NURSE PRACTITIONER

## 2024-06-13 PROCEDURE — 25000003 PHARM REV CODE 250: Performed by: ANESTHESIOLOGY

## 2024-06-13 PROCEDURE — 36000708 HC OR TIME LEV III 1ST 15 MIN: Performed by: STUDENT IN AN ORGANIZED HEALTH CARE EDUCATION/TRAINING PROGRAM

## 2024-06-13 PROCEDURE — 0UT90ZZ RESECTION OF UTERUS, OPEN APPROACH: ICD-10-PCS | Performed by: STUDENT IN AN ORGANIZED HEALTH CARE EDUCATION/TRAINING PROGRAM

## 2024-06-13 PROCEDURE — 94799 UNLISTED PULMONARY SVC/PX: CPT

## 2024-06-13 DEVICE — STENT URT DBL PGTL FLX AQUA 6F: Type: IMPLANTABLE DEVICE | Site: URETER | Status: FUNCTIONAL

## 2024-06-13 RX ORDER — KETOROLAC TROMETHAMINE 30 MG/ML
INJECTION, SOLUTION INTRAMUSCULAR; INTRAVENOUS
Status: DISCONTINUED | OUTPATIENT
Start: 2024-06-13 | End: 2024-06-13

## 2024-06-13 RX ORDER — FAMOTIDINE 20 MG/1
20 TABLET, FILM COATED ORAL
Status: COMPLETED | OUTPATIENT
Start: 2024-06-13 | End: 2024-06-13

## 2024-06-13 RX ORDER — CELECOXIB 200 MG/1
400 CAPSULE ORAL
Status: COMPLETED | OUTPATIENT
Start: 2024-06-13 | End: 2024-06-13

## 2024-06-13 RX ORDER — TRAMADOL HYDROCHLORIDE 50 MG/1
50 TABLET ORAL
Status: COMPLETED | OUTPATIENT
Start: 2024-06-13 | End: 2024-06-13

## 2024-06-13 RX ORDER — ONDANSETRON 4 MG/1
4 TABLET, ORALLY DISINTEGRATING ORAL EVERY 8 HOURS PRN
Status: DISCONTINUED | OUTPATIENT
Start: 2024-06-13 | End: 2024-06-16 | Stop reason: HOSPADM

## 2024-06-13 RX ORDER — MIDAZOLAM HYDROCHLORIDE 2 MG/2ML
2 INJECTION, SOLUTION INTRAMUSCULAR; INTRAVENOUS ONCE AS NEEDED
Status: COMPLETED | OUTPATIENT
Start: 2024-06-13 | End: 2024-06-13

## 2024-06-13 RX ORDER — SIMETHICONE 80 MG
1 TABLET,CHEWABLE ORAL 3 TIMES DAILY PRN
Status: DISCONTINUED | OUTPATIENT
Start: 2024-06-13 | End: 2024-06-16 | Stop reason: HOSPADM

## 2024-06-13 RX ORDER — SUCCINYLCHOLINE CHLORIDE 20 MG/ML
INJECTION INTRAMUSCULAR; INTRAVENOUS
Status: DISCONTINUED | OUTPATIENT
Start: 2024-06-13 | End: 2024-06-13

## 2024-06-13 RX ORDER — SODIUM CHLORIDE, SODIUM LACTATE, POTASSIUM CHLORIDE, CALCIUM CHLORIDE 600; 310; 30; 20 MG/100ML; MG/100ML; MG/100ML; MG/100ML
INJECTION, SOLUTION INTRAVENOUS CONTINUOUS
Status: DISCONTINUED | OUTPATIENT
Start: 2024-06-13 | End: 2024-06-13

## 2024-06-13 RX ORDER — CEFAZOLIN SODIUM 2 G/50ML
2 SOLUTION INTRAVENOUS
Status: COMPLETED | OUTPATIENT
Start: 2024-06-13 | End: 2024-06-13

## 2024-06-13 RX ORDER — MEPERIDINE HYDROCHLORIDE 25 MG/ML
12.5 INJECTION INTRAMUSCULAR; INTRAVENOUS; SUBCUTANEOUS EVERY 10 MIN PRN
Status: DISCONTINUED | OUTPATIENT
Start: 2024-06-13 | End: 2024-06-13

## 2024-06-13 RX ORDER — DEXAMETHASONE SODIUM PHOSPHATE 4 MG/ML
INJECTION, SOLUTION INTRA-ARTICULAR; INTRALESIONAL; INTRAMUSCULAR; INTRAVENOUS; SOFT TISSUE
Status: DISCONTINUED | OUTPATIENT
Start: 2024-06-13 | End: 2024-06-13

## 2024-06-13 RX ORDER — MUPIROCIN 20 MG/G
OINTMENT TOPICAL
Status: DISCONTINUED | OUTPATIENT
Start: 2024-06-13 | End: 2024-06-13 | Stop reason: HOSPADM

## 2024-06-13 RX ORDER — ONDANSETRON 4 MG/1
8 TABLET, ORALLY DISINTEGRATING ORAL EVERY 6 HOURS PRN
Status: DISCONTINUED | OUTPATIENT
Start: 2024-06-13 | End: 2024-06-16 | Stop reason: HOSPADM

## 2024-06-13 RX ORDER — ONDANSETRON HYDROCHLORIDE 2 MG/ML
4 INJECTION, SOLUTION INTRAVENOUS DAILY PRN
Status: DISCONTINUED | OUTPATIENT
Start: 2024-06-13 | End: 2024-06-13

## 2024-06-13 RX ORDER — MORPHINE SULFATE 2 MG/ML
2 INJECTION, SOLUTION INTRAMUSCULAR; INTRAVENOUS EVERY 5 MIN PRN
Status: DISCONTINUED | OUTPATIENT
Start: 2024-06-13 | End: 2024-06-13

## 2024-06-13 RX ORDER — PROMETHAZINE HYDROCHLORIDE 25 MG/ML
6.25 INJECTION, SOLUTION INTRAMUSCULAR; INTRAVENOUS ONCE
Status: COMPLETED | OUTPATIENT
Start: 2024-06-13 | End: 2024-06-13

## 2024-06-13 RX ORDER — SCOLOPAMINE TRANSDERMAL SYSTEM 1 MG/1
1 PATCH, EXTENDED RELEASE TRANSDERMAL
Status: DISCONTINUED | OUTPATIENT
Start: 2024-06-13 | End: 2024-06-13

## 2024-06-13 RX ORDER — HYDROMORPHONE HYDROCHLORIDE 1 MG/ML
INJECTION, SOLUTION INTRAMUSCULAR; INTRAVENOUS; SUBCUTANEOUS
Status: DISCONTINUED | OUTPATIENT
Start: 2024-06-13 | End: 2024-06-13

## 2024-06-13 RX ORDER — SODIUM CHLORIDE 9 MG/ML
INJECTION, SOLUTION INTRAVENOUS CONTINUOUS
Status: DISCONTINUED | OUTPATIENT
Start: 2024-06-13 | End: 2024-06-13

## 2024-06-13 RX ORDER — DIPHENHYDRAMINE HYDROCHLORIDE 50 MG/ML
25 INJECTION INTRAMUSCULAR; INTRAVENOUS EVERY 4 HOURS PRN
Status: DISCONTINUED | OUTPATIENT
Start: 2024-06-13 | End: 2024-06-16 | Stop reason: HOSPADM

## 2024-06-13 RX ORDER — ACETAMINOPHEN 500 MG
1000 TABLET ORAL EVERY 8 HOURS
Status: DISCONTINUED | OUTPATIENT
Start: 2024-06-13 | End: 2024-06-16 | Stop reason: HOSPADM

## 2024-06-13 RX ORDER — HEPARIN SODIUM 5000 [USP'U]/ML
5000 INJECTION, SOLUTION INTRAVENOUS; SUBCUTANEOUS
Status: COMPLETED | OUTPATIENT
Start: 2024-06-13 | End: 2024-06-13

## 2024-06-13 RX ORDER — GLYCOPYRROLATE 0.2 MG/ML
INJECTION INTRAMUSCULAR; INTRAVENOUS
Status: DISCONTINUED | OUTPATIENT
Start: 2024-06-13 | End: 2024-06-13

## 2024-06-13 RX ORDER — FENTANYL CITRATE 50 UG/ML
INJECTION, SOLUTION INTRAMUSCULAR; INTRAVENOUS
Status: DISCONTINUED | OUTPATIENT
Start: 2024-06-13 | End: 2024-06-13

## 2024-06-13 RX ORDER — KETOROLAC TROMETHAMINE 30 MG/ML
30 INJECTION, SOLUTION INTRAMUSCULAR; INTRAVENOUS EVERY 8 HOURS
Status: COMPLETED | OUTPATIENT
Start: 2024-06-13 | End: 2024-06-14

## 2024-06-13 RX ORDER — GABAPENTIN 300 MG/1
300 CAPSULE ORAL 3 TIMES DAILY
Status: DISCONTINUED | OUTPATIENT
Start: 2024-06-13 | End: 2024-06-13

## 2024-06-13 RX ORDER — OXYCODONE HYDROCHLORIDE 5 MG/1
5 TABLET ORAL
Status: DISCONTINUED | OUTPATIENT
Start: 2024-06-13 | End: 2024-06-13

## 2024-06-13 RX ORDER — ONDANSETRON HYDROCHLORIDE 2 MG/ML
INJECTION, SOLUTION INTRAMUSCULAR; INTRAVENOUS
Status: DISCONTINUED | OUTPATIENT
Start: 2024-06-13 | End: 2024-06-13

## 2024-06-13 RX ORDER — MUPIROCIN 20 MG/G
OINTMENT TOPICAL 2 TIMES DAILY
Status: COMPLETED | OUTPATIENT
Start: 2024-06-13 | End: 2024-06-15

## 2024-06-13 RX ORDER — ACETAMINOPHEN 500 MG
1000 TABLET ORAL
Status: COMPLETED | OUTPATIENT
Start: 2024-06-13 | End: 2024-06-13

## 2024-06-13 RX ORDER — ROCURONIUM BROMIDE 10 MG/ML
INJECTION, SOLUTION INTRAVENOUS
Status: DISCONTINUED | OUTPATIENT
Start: 2024-06-13 | End: 2024-06-13

## 2024-06-13 RX ORDER — OXYCODONE HYDROCHLORIDE 5 MG/1
5 TABLET ORAL EVERY 6 HOURS PRN
Status: DISCONTINUED | OUTPATIENT
Start: 2024-06-13 | End: 2024-06-16 | Stop reason: HOSPADM

## 2024-06-13 RX ORDER — SODIUM CHLORIDE 0.9 % (FLUSH) 0.9 %
10 SYRINGE (ML) INJECTION
Status: DISCONTINUED | OUTPATIENT
Start: 2024-06-13 | End: 2024-06-16 | Stop reason: HOSPADM

## 2024-06-13 RX ORDER — LIDOCAINE HYDROCHLORIDE 20 MG/ML
INJECTION, SOLUTION EPIDURAL; INFILTRATION; INTRACAUDAL; PERINEURAL
Status: DISCONTINUED | OUTPATIENT
Start: 2024-06-13 | End: 2024-06-13

## 2024-06-13 RX ORDER — PROPOFOL 10 MG/ML
VIAL (ML) INTRAVENOUS
Status: DISCONTINUED | OUTPATIENT
Start: 2024-06-13 | End: 2024-06-13

## 2024-06-13 RX ORDER — HYDROCODONE BITARTRATE AND ACETAMINOPHEN 500; 5 MG/1; MG/1
TABLET ORAL
Status: DISCONTINUED | OUTPATIENT
Start: 2024-06-13 | End: 2024-06-13 | Stop reason: HOSPADM

## 2024-06-13 RX ORDER — FAMOTIDINE 10 MG/ML
20 INJECTION INTRAVENOUS EVERY 12 HOURS
Status: DISCONTINUED | OUTPATIENT
Start: 2024-06-13 | End: 2024-06-16 | Stop reason: HOSPADM

## 2024-06-13 RX ORDER — GABAPENTIN 300 MG/1
600 CAPSULE ORAL
Status: COMPLETED | OUTPATIENT
Start: 2024-06-13 | End: 2024-06-13

## 2024-06-13 RX ORDER — PROMETHAZINE HYDROCHLORIDE 25 MG/ML
INJECTION, SOLUTION INTRAMUSCULAR; INTRAVENOUS
Status: COMPLETED
Start: 2024-06-13 | End: 2024-06-13

## 2024-06-13 RX ORDER — IBUPROFEN 400 MG/1
800 TABLET ORAL EVERY 8 HOURS
Status: DISCONTINUED | OUTPATIENT
Start: 2024-06-14 | End: 2024-06-16 | Stop reason: HOSPADM

## 2024-06-13 RX ORDER — BISACODYL 10 MG/1
10 SUPPOSITORY RECTAL DAILY PRN
Status: DISCONTINUED | OUTPATIENT
Start: 2024-06-13 | End: 2024-06-13

## 2024-06-13 RX ORDER — DIPHENHYDRAMINE HCL 25 MG
25 CAPSULE ORAL EVERY 4 HOURS PRN
Status: DISCONTINUED | OUTPATIENT
Start: 2024-06-13 | End: 2024-06-16 | Stop reason: HOSPADM

## 2024-06-13 RX ADMIN — GABAPENTIN 600 MG: 300 CAPSULE ORAL at 05:06

## 2024-06-13 RX ADMIN — TRAMADOL HYDROCHLORIDE 50 MG: 50 TABLET, COATED ORAL at 05:06

## 2024-06-13 RX ADMIN — SODIUM CHLORIDE, POTASSIUM CHLORIDE, SODIUM LACTATE AND CALCIUM CHLORIDE: 600; 310; 30; 20 INJECTION, SOLUTION INTRAVENOUS at 06:06

## 2024-06-13 RX ADMIN — FENTANYL CITRATE 100 MCG: 50 INJECTION, SOLUTION INTRAMUSCULAR; INTRAVENOUS at 07:06

## 2024-06-13 RX ADMIN — ACETAMINOPHEN 1000 MG: 500 TABLET ORAL at 11:06

## 2024-06-13 RX ADMIN — FAMOTIDINE 20 MG: 20 TABLET, FILM COATED ORAL at 05:06

## 2024-06-13 RX ADMIN — PROMETHAZINE HYDROCHLORIDE 6.25 MG: 25 INJECTION, SOLUTION INTRAMUSCULAR; INTRAVENOUS at 01:06

## 2024-06-13 RX ADMIN — SCOPOLAMINE 1 PATCH: 1 PATCH TRANSDERMAL at 05:06

## 2024-06-13 RX ADMIN — DEXAMETHASONE SODIUM PHOSPHATE 8 MG: 4 INJECTION, SOLUTION INTRA-ARTICULAR; INTRALESIONAL; INTRAMUSCULAR; INTRAVENOUS; SOFT TISSUE at 07:06

## 2024-06-13 RX ADMIN — FAMOTIDINE 20 MG: 10 INJECTION, SOLUTION INTRAVENOUS at 09:06

## 2024-06-13 RX ADMIN — CEFAZOLIN SODIUM 2 G: 2 SOLUTION INTRAVENOUS at 11:06

## 2024-06-13 RX ADMIN — GLYCOPYRROLATE 0.2 MG: 0.2 INJECTION INTRAMUSCULAR; INTRAVENOUS at 07:06

## 2024-06-13 RX ADMIN — MUPIROCIN: 20 OINTMENT TOPICAL at 08:06

## 2024-06-13 RX ADMIN — SUGAMMADEX 400 MG: 100 INJECTION, SOLUTION INTRAVENOUS at 11:06

## 2024-06-13 RX ADMIN — ROCURONIUM BROMIDE 20 MG: 10 INJECTION INTRAVENOUS at 08:06

## 2024-06-13 RX ADMIN — PROPOFOL 200 MG: 10 INJECTION, EMULSION INTRAVENOUS at 07:06

## 2024-06-13 RX ADMIN — SUCCINYLCHOLINE CHLORIDE 100 MG: 20 INJECTION, SOLUTION INTRAMUSCULAR; INTRAVENOUS; PARENTERAL at 07:06

## 2024-06-13 RX ADMIN — KETOROLAC TROMETHAMINE 30 MG: 30 INJECTION, SOLUTION INTRAMUSCULAR; INTRAVENOUS at 09:06

## 2024-06-13 RX ADMIN — ACETAMINOPHEN 1000 MG: 500 TABLET ORAL at 02:06

## 2024-06-13 RX ADMIN — MIDAZOLAM HYDROCHLORIDE 2 MG: 1 INJECTION, SOLUTION INTRAMUSCULAR; INTRAVENOUS at 06:06

## 2024-06-13 RX ADMIN — GABAPENTIN 300 MG: 300 CAPSULE ORAL at 02:06

## 2024-06-13 RX ADMIN — HEPARIN SODIUM 5000 UNITS: 5000 INJECTION, SOLUTION INTRAVENOUS; SUBCUTANEOUS at 05:06

## 2024-06-13 RX ADMIN — ONDANSETRON 4 MG: 2 INJECTION INTRAMUSCULAR; INTRAVENOUS at 07:06

## 2024-06-13 RX ADMIN — OXYCODONE HYDROCHLORIDE 5 MG: 5 TABLET ORAL at 05:06

## 2024-06-13 RX ADMIN — LIDOCAINE HYDROCHLORIDE 100 MG: 20 INJECTION, SOLUTION EPIDURAL; INFILTRATION; INTRACAUDAL; PERINEURAL at 07:06

## 2024-06-13 RX ADMIN — CEFAZOLIN SODIUM 2 G: 2 SOLUTION INTRAVENOUS at 07:06

## 2024-06-13 RX ADMIN — KETOROLAC TROMETHAMINE 30 MG: 30 INJECTION, SOLUTION INTRAMUSCULAR at 07:06

## 2024-06-13 RX ADMIN — CELECOXIB 400 MG: 200 CAPSULE ORAL at 05:06

## 2024-06-13 RX ADMIN — KETOROLAC TROMETHAMINE 30 MG: 30 INJECTION, SOLUTION INTRAMUSCULAR; INTRAVENOUS at 02:06

## 2024-06-13 RX ADMIN — IOHEXOL 50 ML: 350 INJECTION, SOLUTION INTRAVENOUS at 10:06

## 2024-06-13 RX ADMIN — HYDROMORPHONE HYDROCHLORIDE 0.5 MG: 1 INJECTION, SOLUTION INTRAMUSCULAR; INTRAVENOUS; SUBCUTANEOUS at 11:06

## 2024-06-13 RX ADMIN — MEPERIDINE HYDROCHLORIDE 12.5 MG: 25 INJECTION INTRAMUSCULAR; INTRAVENOUS; SUBCUTANEOUS at 01:06

## 2024-06-13 RX ADMIN — ROCURONIUM BROMIDE 20 MG: 10 INJECTION INTRAVENOUS at 10:06

## 2024-06-13 RX ADMIN — ROCURONIUM BROMIDE 50 MG: 10 INJECTION INTRAVENOUS at 07:06

## 2024-06-13 RX ADMIN — ACETAMINOPHEN 1000 MG: 500 TABLET ORAL at 05:06

## 2024-06-13 RX ADMIN — PROMETHAZINE HYDROCHLORIDE 6.25 MG: 25 INJECTION INTRAMUSCULAR; INTRAVENOUS at 01:06

## 2024-06-13 RX ADMIN — SODIUM CHLORIDE, POTASSIUM CHLORIDE, SODIUM LACTATE AND CALCIUM CHLORIDE: 600; 310; 30; 20 INJECTION, SOLUTION INTRAVENOUS at 01:06

## 2024-06-13 NOTE — BRIEF OP NOTE
[facility] - Periop Services  Brief Operative Note    SUMMARY     Surgery Date: 6/13/2024     Surgeons and Role:     * Sarah Barrera MD - Primary     * Norma Negrete DO - Assisting     * Dino Zuleta MD -PGY3     * Abimbola Bates MD-PGY2    Pre-Operative Diagnosis:   Abnormal Uterine Bleeding  Uteromegaly    Post-Operative Diagnosis:   Abnormal Uterine Bleeding  Uteromegaly    Anesthesia: General    Procedure(s) (LRB):  HYSTERECTOMY, TOTAL, ABDOMINAL, SALPINGECTOMY, WITH CYSTOSCOPY (N/A)  INSERTION, STENT, URETER (Left)    Implants:  Implant Name Type Inv. Item Serial No.  Lot No. LRB No. Used Action   STENT URT DBL PGTL FLX AQUA 6F - [id number]  STENT URT DBL PGTL FLX AQUA 6F  [employer]  Left 1 Implanted     Operative Findings: EUA: Normal external genitalia without lesion. Uterus 22 week size, very broad but mobile. Uterus palpated through posterior vagina, smooth in contour. Intra-op, 20-22cm uterus, boggy, smooth in contour. Bilateral ovaries and tubes overall normal appearing. Round ligament on stretch given large uterine size. Left side bowel adhesions noted to lateral uterus, easily dissected off with sharp and blunt dissection.     Cystoscopy: Urethra normal without polyp or lesion. Bladder mucosa visualized globally without lesion, petechiae, diverticula, or stones in the trigone, inlet, and dome. Bilateral ureteral orifices visualized with strong efflux of urine and without lesion. See Urology op note for further findings during left stent placement.     Estimated Blood Loss: 1100 mL    IVF: 1500 mL LR    UOP: 250 mL         Specimens:   Specimen (24h ago, onward)       Start     Ordered    06/13/24 0956  Specimen to Pathology  RELEASE UPON ORDERING        References:    Click here for ordering Quick Tip   Question:  Release to patient  Answer:  Immediate    06/13/24 0956                  Technique: See full op note    Condition: Good     Disposition: PACU - hemodynamically  stable.    Dino Zuleta MD  [facility]-PGY3  06/13/2024 12:26 PM

## 2024-06-13 NOTE — PLAN OF CARE
Problem: Adult Inpatient Plan of Care  Goal: Plan of Care Review  Outcome: Progressing  Flowsheets (Taken 6/13/2024 1744)  Plan of Care Reviewed With:   patient   spouse   child  Goal: Absence of Hospital-Acquired Illness or Injury  Outcome: Progressing  Intervention: Identify and Manage Fall Risk  Flowsheets (Taken 6/13/2024 1744)  Safety Promotion/Fall Prevention:   assistive device/personal item within reach   Fall Risk signage in place   high risk medications identified   Fall Risk reviewed with patient/family   medications reviewed   lighting adjusted   side rails raised x 2   instructed to call staff for mobility  Intervention: Prevent Skin Injury  Flowsheets (Taken 6/13/2024 1744)  Body Position: 30 degrees  Skin Protection: protective footwear used  Device Skin Pressure Protection: tubing/devices free from skin contact  Intervention: Prevent and Manage VTE (Venous Thromboembolism) Risk  Flowsheets (Taken 6/13/2024 1744)  VTE Prevention/Management:   remove, assess skin, and reapply sequential compression device   bleeding precations maintained  Intervention: Prevent Infection  Flowsheets (Taken 6/13/2024 1744)  Infection Prevention:   rest/sleep promoted   hand hygiene promoted

## 2024-06-13 NOTE — INTERVAL H&P NOTE
H&P Interval Update    Patient seen and evaluated by myself and the staff attending this morning. There have been no changes since her preop visit with Dr. Zuleta and Dr. Kimball (see below). The patient is able to express in her own words the procedure undergoing on today and wishes to proceed. No changes in PMH/ED visits/Hospital admissions since last visit (H&P noted below). NPO since 2000 yesterday. She is here today with her daughter and her , who she would like updated after the procedure.    To the OR for THALIA/BS/Cysto for AUB-L and anemia.    Abimbola Alfaro MD  [facility] - PGY-2  6:04 AM 06/13/2024

## 2024-06-13 NOTE — OP NOTE
[facility]  Surgery Department  Operative Note    SUMMARY     Date of Procedure: 6/13/2024     Procedure: Procedure(s) (LRB):  HYSTERECTOMY, TOTAL, ABDOMINAL, SALPINGECTOMY, WITH CYSTOSCOPY (N/A)  INSERTION, STENT, URETER (Left)     Surgeons and Role:     * Sarah Barrera MD - Primary     * Dino Zuleta MD - PGY3     * Abimbola Alfaro MD - PGY2     * Norma Negrete DO - Assisting    Pre-Operative Diagnosis:   Abnormal Uterine Bleeding  Uteromegaly    Post-Operative Diagnosis:   Abnormal Uterine Bleeding  Uteromegaly    Anesthesia: General    Operative Findings: EUA: Normal external genitalia without lesion. Uterus 22 week size, very broad but mobile. Uterus palpated through posterior vagina, smooth in contour. Intra-op, 20-22cm uterus, boggy, smooth in contour. Bilateral ovaries and tubes overall normal appearing. Round ligament on stretch given large uterine size. Left side bowel adhesions noted to lateral uterus, easily dissected off with sharp and blunt dissection.     Cystoscopy: Urethra normal without polyp or lesion. Bladder mucosa visualized globally without lesion, petechiae, diverticula, or stones in the trigone, inlet, and dome. Bilateral ureteral orifices visualized with strong efflux of urine and without lesion. See Urology op note for further findings during left stent placement.     Description of Technical Procedures: Patient was taken to the operating suite with IV fluids running and placed in supine position.  SCDs were applied to bilateral lower extremities for DVT prophylaxis as well as preop Heparin given compression on IVC from large uterine size. General anesthesia was then achieved without difficulty. Ancef was given for infection prophylaxis. Conner catheter was placed and patient was then prepped and draped in the usual sterile.       A midline vertical skin incision was made 1 cm below the umbilicus extending down to 2 cm above the pubic symphysis. This was  made with the knife and then carried down to the underlying layer of the fascia with the bovie. Fascia was excised in the midline and extended superiorly and inferiorly with the bovie. The rectus muscle was then  in the midline. The peritoneum identified and entered bluntly. The peritoneal incision was then extended superiorly and inferiorly, careful to avoid the bladder. The uterus was markedly enlarged upon entering the peritoneal cavity. Given large uterine size, the incision was extended cranially around the left of the umbilicus and 2 cm above. The uterus was then delivered through the laparotomy.      The Jabier retractor was then placed for visualization. The right round ligament was then suture ligated with a 2-0 Vicryl suture and fulgurated using the Enseal device.  Good hemostasis was noted. At this point, the anterior leaflet of the broad ligament was very minimal given the large uterus putting the broad on stretch. We turned our attention to the right Fallopian tube which was grasped with Alexandrea clamps, elevated, and the Enseal device was used to dessicate and transect the mesosalpinx along the axis of the tube. This was continued until reaching the cornua at which point the tube was transected, removed from the pelvis, and sent for pathology. The right utero-ovarian ligament was then identified, elevated, and a window through the broad was made. At this point two curved Heaneys were placed and the Enseal device was used to dessicate and transect the ligament between the clamps. A free tie was placed on the uterine side for back bleeding. A free tie and fixation stitch were placed on the remaining ovarian side with hemostasis noted. At this point, attention was turned towards developing the bladder flap. The right round ligament pedical was grasped, elevated, and the anterior broad was dissected down towards the bladder using the metzenbaum scissors. The bladder was noted to be fairly high on  the uterus. A bladder flap was then developed sharply and the bladder was easily retracted down with a sponge on a stick and traction-counter traction.     Attention was then turned to the left aspect of the uterus. The left Fallopian tube which was grasped with Alexandrea clamps, elevated, and the Enseal device was used to dessicate and transect the mesosalpinx along the axis of the tube. This was continued until reaching the cornua at which point the tube was transected, removed from the pelvis, and sent for pathology. The utero-ovarian ligament was then identified, elevated, and a window through the broad was made. Two curved Heaneys were placed and the Enseal device was used to dessicate and transect the ligament between the clamps. A free tie was placed on the uterine side for back bleeding. A free tie and fixation stitch were placed on the remaining ovarian side with hemostasis noted. The left round ligament was also noted to be on extreme stretch. The Enseal device was used to clamp, dessicate, and transect the round ligament. Pedicles were hemostatic. The anterior leaf of the broad was then dissected to the left of the bladder flap that was developed from the opposite side. The bladder was further dissected off the anterior uterus with blunt dissection and noted to be dropped down off the anterior uterus and cervix. At this point, the left ovary was still adhered toward the superior portion of the uterus. In order to continue to dissect the posterior leaf of the broad to access the uterine vessels, the ovary and adhesions were carefully dissected of the side of the uterus. As this was done, the posterior broad was also able to be dissected down towards the level of the uterine vessels. Once dropped away, the uterine vessels were observed. Once skeletonized, the Enseal device was used to grasp and dessicate the uterine arteries x3. Before making the more lateral desiccation, the left ureter was noted to be  pulled medially towards the Enseal device. At this point, the left ureter was carefully skeletonized and dropped laterally. After close inspection, there was no injury noted to the ureter and vermiculation was noted. Given concern for a possible delayed thermal injury, urology was consulted intra-op and a stent was placed cystoscopically. See op note from Urology.     At this point, the ureter was identified as lateral and away from the site of uterine vessel transection. The Enseal device was again used to grasp, dessicate, and transect the uterine artery. Hemostasis was noted. Given the large size of the uterus and difficulty with visualization into the pelvis, it was amputated. At this point, the uterine vasculature and bladder were clear of site of complete cervical transection. Two curved Heaneys were placed below cervix at the external os and amputated. Vicryl suture were then used to place a Alisia stitch at each edge of the cuff, making sure to incorporate the uterosacral ligaments. The rest of the cuff was closed with figures of eight.    A cystoscope was then used to inspect the bladder with findings noted above. No injury was noted. Bilateral efflux from both ureteral orifices were noted. Urology placed a stent at time of cystoscopy.     The abdomen was then irrigated and the pedicles re-inspected and noted to be hemostatic. The laps and all instruments including the Jabier retractor were removed from the abdomen. The fascia was closed using a Loop PDS suture from either angle. The subcutaneous layer was thoroughly inspected, irrigated and hemostasis was achieved with the Bovie. The subcutaneous layer was closed in an interrupted fashion with plain sherwin suture. The skin was reapproximated using a staples and a dressing was applied.      All counts were correct x 2. The patient tolerated the procedure well and was taken to the recovery room in a stable condition s/p extubation.    Estimated Blood Loss  (EBL): 1100 mL    IVF: 1500 mL LR    UOP: 250           Implants:   Implant Name Type Inv. Item Serial No.  Lot No. LRB No. Used Action   STENT URT DBL PGTL FLX AQUA 6F - [id number]  STENT URT DBL PGTL FLX AQUA 6F  [employer]  Left 1 Implanted     Specimens:   Specimen (24h ago, onward)       Start     Ordered    06/13/24 0956  Specimen to Pathology  RELEASE UPON ORDERING        References:    Click here for ordering Quick Tip   Question:  Release to patient  Answer:  Immediate    06/13/24 0956                            Condition: Good    Disposition: PACU - hemodynamically stable.    Dion Zuleta MD  [facility]-PGY3  06/13/2024 12:14 PM

## 2024-06-13 NOTE — TRANSFER OF CARE
Anesthesia Transfer of Care Note    Patient: Eileen Clarek    Procedure(s) Performed: Procedure(s) (LRB):  HYSTERECTOMY, TOTAL, ABDOMINAL, SALPINGECTOMY, WITH CYSTOSCOPY (N/A)  INSERTION, STENT, URETER (Left)    Patient location: PACU    Transport from OR: Transported from OR on room air with adequate spontaneous ventilation    Post assessment: no apparent anesthetic complications    Post vital signs: stable    Level of consciousness: awake    Nausea/Vomiting: no nausea/vomiting    Complications: none    Transfer of care protocol was followed      Last vitals: Visit Vitals  /71   Pulse 72   Temp 36 °C (96.8 °F) (Temporal)   Resp 20   Wt 81.7 kg (180 lb 3.2 oz)   LMP 05/15/2024 (Approximate)   SpO2 100%   Breastfeeding No   BMI 32.96 kg/m²

## 2024-06-14 ENCOUNTER — TELEPHONE (OUTPATIENT)
Dept: GYNECOLOGY | Facility: CLINIC | Age: 49
End: 2024-06-14
Payer: MEDICAID

## 2024-06-14 LAB
ALBUMIN SERPL-MCNC: 3 G/DL (ref 3.5–5)
ALBUMIN/GLOB SERPL: 1 RATIO (ref 1.1–2)
ALP SERPL-CCNC: 55 UNIT/L (ref 40–150)
ALT SERPL-CCNC: 13 UNIT/L (ref 0–55)
ANION GAP SERPL CALC-SCNC: 6 MEQ/L
AST SERPL-CCNC: 25 UNIT/L (ref 5–34)
BILIRUB SERPL-MCNC: 0.8 MG/DL
BUN SERPL-MCNC: 16.6 MG/DL (ref 7–18.7)
CALCIUM SERPL-MCNC: 8 MG/DL (ref 8.4–10.2)
CHLORIDE SERPL-SCNC: 112 MMOL/L (ref 98–107)
CO2 SERPL-SCNC: 18 MMOL/L (ref 22–29)
CREAT SERPL-MCNC: 0.93 MG/DL (ref 0.55–1.02)
CREAT/UREA NIT SERPL: 18
ERYTHROCYTE [DISTWIDTH] IN BLOOD BY AUTOMATED COUNT: 20.3 % (ref 11.5–17)
GFR SERPLBLD CREATININE-BSD FMLA CKD-EPI: >60 ML/MIN/1.73/M2
GLOBULIN SER-MCNC: 3.1 GM/DL (ref 2.4–3.5)
GLUCOSE SERPL-MCNC: 141 MG/DL (ref 74–100)
HCT VFR BLD AUTO: 28.7 % (ref 37–47)
HGB BLD-MCNC: 8.8 G/DL (ref 12–16)
MCH RBC QN AUTO: 23.5 PG (ref 27–31)
MCHC RBC AUTO-ENTMCNC: 30.7 G/DL (ref 33–36)
MCV RBC AUTO: 76.7 FL (ref 80–94)
NRBC BLD AUTO-RTO: 0 %
PLATELET # BLD AUTO: 233 X10(3)/MCL (ref 130–400)
PMV BLD AUTO: 9.1 FL (ref 7.4–10.4)
POTASSIUM SERPL-SCNC: 4 MMOL/L (ref 3.5–5.1)
PROT SERPL-MCNC: 6.1 GM/DL (ref 6.4–8.3)
RBC # BLD AUTO: 3.74 X10(6)/MCL (ref 4.2–5.4)
SODIUM SERPL-SCNC: 136 MMOL/L (ref 136–145)
WBC # SPEC AUTO: 14.38 X10(3)/MCL (ref 4.5–11.5)

## 2024-06-14 PROCEDURE — 94761 N-INVAS EAR/PLS OXIMETRY MLT: CPT

## 2024-06-14 PROCEDURE — 11000001 HC ACUTE MED/SURG PRIVATE ROOM

## 2024-06-14 PROCEDURE — 25000003 PHARM REV CODE 250

## 2024-06-14 PROCEDURE — 52332 CYSTOSCOPY AND TREATMENT: CPT | Mod: LT,,, | Performed by: UROLOGY

## 2024-06-14 PROCEDURE — 80053 COMPREHEN METABOLIC PANEL: CPT

## 2024-06-14 PROCEDURE — 36415 COLL VENOUS BLD VENIPUNCTURE: CPT

## 2024-06-14 PROCEDURE — 63600175 PHARM REV CODE 636 W HCPCS

## 2024-06-14 PROCEDURE — 94799 UNLISTED PULMONARY SVC/PX: CPT

## 2024-06-14 PROCEDURE — 0T778DZ DILATION OF LEFT URETER WITH INTRALUMINAL DEVICE, VIA NATURAL OR ARTIFICIAL OPENING ENDOSCOPIC: ICD-10-PCS | Performed by: UROLOGY

## 2024-06-14 PROCEDURE — BT1F1ZZ FLUOROSCOPY OF LEFT KIDNEY, URETER AND BLADDER USING LOW OSMOLAR CONTRAST: ICD-10-PCS | Performed by: UROLOGY

## 2024-06-14 PROCEDURE — 74420 UROGRAPHY RTRGR +-KUB: CPT | Mod: 26,,, | Performed by: UROLOGY

## 2024-06-14 PROCEDURE — 85027 COMPLETE CBC AUTOMATED: CPT

## 2024-06-14 RX ADMIN — KETOROLAC TROMETHAMINE 30 MG: 30 INJECTION, SOLUTION INTRAMUSCULAR; INTRAVENOUS at 06:06

## 2024-06-14 RX ADMIN — ACETAMINOPHEN 1000 MG: 500 TABLET ORAL at 06:06

## 2024-06-14 RX ADMIN — OXYCODONE HYDROCHLORIDE 5 MG: 5 TABLET ORAL at 08:06

## 2024-06-14 RX ADMIN — MUPIROCIN: 20 OINTMENT TOPICAL at 09:06

## 2024-06-14 RX ADMIN — FAMOTIDINE 20 MG: 10 INJECTION, SOLUTION INTRAVENOUS at 10:06

## 2024-06-14 RX ADMIN — OXYCODONE HYDROCHLORIDE 5 MG: 5 TABLET ORAL at 03:06

## 2024-06-14 RX ADMIN — OXYCODONE HYDROCHLORIDE 5 MG: 5 TABLET ORAL at 09:06

## 2024-06-14 RX ADMIN — IBUPROFEN 800 MG: 400 TABLET, FILM COATED ORAL at 02:06

## 2024-06-14 NOTE — PROGRESS NOTES
"[facility] INPATIENT NOTE    Subjective:       Eileen Clarke is a 48 y.o.  s/p THALIA/BS/Cysto with stent placement given concern for delayed thermal injury for enlarged uterus.  POD: 1 Hospital Day: 2    Patient is not yet ambulating. Tolerating regular diet without nausea or emesis. Pain well controlled with PO medications. Patient is voiding via Conner. Reports no flatus. denies chest pain, shortness or breath, fevers, chills, weakness or dizziness.     Objective:     VITAL SIGNS: 24 HR MIN & MAX Most Recent Vitals 24 HR Intake & Output   Temp  Min: 97.5 °F (36.4 °C)  Max: 98.1 °F (36.7 °C)  97.9 °F (36.6 °C)  07 -  0700  In: 0.3 [I.V.:0.3]  Out: 1800 [Urine:800]   BP  Min: 120/70  Max: 139/91  125/78     Pulse  Min: 57  Max: 94  94     Resp  Min: 14  Max: 20  16     SpO2  Min: 97 %  Max: 100 %  98 %       BP  Min: 120/70  Max: 139/91  Temp  Av.7 °F (36.5 °C)  Min: 97.5 °F (36.4 °C)  Max: 98.1 °F (36.7 °C)  Pulse  Av.9  Min: 57  Max: 94  Resp  Av.2  Min: 14  Max: 20  SpO2  Av.5 %  Min: 97 %  Max: 100 %  Height  Av' 2" (157.5 cm)  Min: 5' 2" (157.5 cm)  Max: 5' 2" (157.5 cm)  Weight  Av.6 kg (180 lb)  Min: 81.6 kg (180 lb)  Max: 81.6 kg (180 lb)    Body mass index is 32.92 kg/m².    I/O last 3 completed shifts:  In: 0.3 [I.V.:0.3]  Out: 1800 [Urine:800; Blood:1000]      Intake/Output Summary (Last 24 hours) at 2024 0714  Last data filed at 2024 2321  Gross per 24 hour   Intake 0.25 ml   Output 1800 ml   Net -1799.75 ml   UOP: 66 mL/hr    Physical Exam:   General appearance: alert, appears stated age, and cooperative  Lungs: clear to auscultation bilaterally  Heart: regular rate and rhythm, S1, S2 normal, no murmur, click, rub or gallop  Abdomen: soft, ND, mildly tender appropriate postop, pressure bandage removed C/D/I, incision healing well with staples, no erythema or discharge  Pelvic: No vaginal bleeding, Conner bag with dark red urine  Extremities: No " evidence of DVT seen on physical exam.    Labs:   Recent Labs   Lab 06/13/24  0512 06/13/24  1312 06/14/24  0316   WBC 8.91 16.44* 14.38*   HGB 11.2* 9.8* 8.8*   HCT 37.0 33.5* 28.7*    196 233   MCV 77.7* 80.0 76.7*   RDW 21.0* 20.6* 20.3*   NA  --   --  136   K  --   --  4.0   CL  --   --  112*   CO2  --   --  18*   BUN  --   --  16.6   CREATININE  --   --  0.93   CALCIUM  --   --  8.0*   ALBUMIN  --   --  3.0*   BILITOT  --   --  0.8   AST  --   --  25   ALT  --   --  13   ALKPHOS  --   --  55     Recent Results (from the past 24 hour(s))   CBC with Differential    Collection Time: 06/13/24  1:12 PM   Result Value Ref Range    WBC 16.44 (H) 4.50 - 11.50 x10(3)/mcL    RBC 4.19 (L) 4.20 - 5.40 x10(6)/mcL    Hgb 9.8 (L) 12.0 - 16.0 g/dL    Hct 33.5 (L) 37.0 - 47.0 %    MCV 80.0 80.0 - 94.0 fL    MCH 23.4 (L) 27.0 - 31.0 pg    MCHC 29.3 (L) 33.0 - 36.0 g/dL    RDW 20.6 (H) 11.5 - 17.0 %    Platelet 196 130 - 400 x10(3)/mcL    MPV 8.7 7.4 - 10.4 fL    Neut % 87.1 %    Lymph % 7.1 %    Mono % 3.8 %    Eos % 0.0 %    Basophil % 0.1 %    Lymph # 1.16 0.6 - 4.6 x10(3)/mcL    Neut # 14.32 (H) 2.1 - 9.2 x10(3)/mcL    Mono # 0.62 0.1 - 1.3 x10(3)/mcL    Eos # 0.00 0 - 0.9 x10(3)/mcL    Baso # 0.02 <=0.2 x10(3)/mcL    IG# 0.32 (H) 0 - 0.04 x10(3)/mcL    IG% 1.9 %    NRBC% 0.0 %   CBC Without Differential    Collection Time: 06/14/24  3:16 AM   Result Value Ref Range    WBC 14.38 (H) 4.50 - 11.50 x10(3)/mcL    RBC 3.74 (L) 4.20 - 5.40 x10(6)/mcL    Hgb 8.8 (L) 12.0 - 16.0 g/dL    Hct 28.7 (L) 37.0 - 47.0 %    MCV 76.7 (L) 80.0 - 94.0 fL    MCH 23.5 (L) 27.0 - 31.0 pg    MCHC 30.7 (L) 33.0 - 36.0 g/dL    RDW 20.3 (H) 11.5 - 17.0 %    Platelet 233 130 - 400 x10(3)/mcL    MPV 9.1 7.4 - 10.4 fL    NRBC% 0.0 %   Comprehensive Metabolic Panel    Collection Time: 06/14/24  3:16 AM   Result Value Ref Range    Sodium 136 136 - 145 mmol/L    Potassium 4.0 3.5 - 5.1 mmol/L    Chloride 112 (H) 98 - 107 mmol/L    CO2 18 (L) 22 - 29  mmol/L    Glucose 141 (H) 74 - 100 mg/dL    Blood Urea Nitrogen 16.6 7.0 - 18.7 mg/dL    Creatinine 0.93 0.55 - 1.02 mg/dL    Calcium 8.0 (L) 8.4 - 10.2 mg/dL    Protein Total 6.1 (L) 6.4 - 8.3 gm/dL    Albumin 3.0 (L) 3.5 - 5.0 g/dL    Globulin 3.1 2.4 - 3.5 gm/dL    Albumin/Globulin Ratio 1.0 (L) 1.1 - 2.0 ratio    Bilirubin Total 0.8 <=1.5 mg/dL    ALP 55 40 - 150 unit/L    ALT 13 0 - 55 unit/L    AST 25 5 - 34 unit/L    eGFR >60 mL/min/1.73/m2    Anion Gap 6.0 mEq/L    BUN/Creatinine Ratio 18      Pathology:pending     Assessment/Plan:       48 y.o.  s/p THALIA/BS/Cysto with intra-op stent placement by urology for prevention of delayed thermal injury. Doing well postop.    -Lindsey Toradol, Tylenol. Will transition to ibuprofen s/p 24hr. PRN Savi 5/10  -Normotensive, not tachycardic  -satting well on RA  -Diet Adult Regular   -Preop H/H 11.2/37--> QBL 1100 mL --> 8.8/28.7. No sx's of anemia. CTM. Follow up AM CBC  -S/p intra-op stent by urology  -UOP 66 mL/hr  -Will confirm with urology about discontinuing Conner    Disposition:   Anticipated disposition plan: Possibly POD#2-3  She is meeting milestones for discharge.   To discharge once meeting postop milestones    Discussed with Dr. Barrera.    Dino Zuleta MD  [facility]-PGY3  2024 7:50 AM

## 2024-06-14 NOTE — OP NOTE
UROLOGY OPERATIVE NOTE      Date of Procedure:   13 June 2024    Pre-op Diagnosis:  Possible thermal injury to the left ureter    Post-op Diagnosis:  Possible thermal injury to the left ureter    Procedure: Cystoscopy, left Retrograde Pyelogram, left Ureteral Stent    Surgeon: Norma Negrete D.O.    Assistant: None    Anesthesia:  General    Complications: None    Blood Loss:  None    Indications:  I was called to the operating room by the gynecology operating team because they were concerned of a possible thermal injury to the left ureter.  They requested placement of a left ureteral stent.    Procedure in Detail:  Patient was under anesthesia and in lithotomy position.  Time out was performed.       A 22F cystoscope was lubricated and advanced through the urethra into the bladder.  The urethra was normal without strictures or lesions.  The bladder was inspected and was noted to have no mucosal abnormalities.  The ureteral orifices were located on the trigone with efflux bilaterally.  The left ureter was cannulated with a 5F open ended catheter.  Gentle retrograde pyelogram was performed showing  normal course of the left ureter without extravasation or hydronephrosis.   A Glidewire was advanced through the 5F catheter which was then backed off leaving the wire in place.  A 6 F 26 cm double J ureteral stent was advanced over the wire and into the renal pelvis.  There was noted to be a good curl of stent in the renal pelvis under fluoroscopy and a good curl of stent in the bladder under direct visualization.  The bladder was emptied.     Disposition:  The patient was returned to the care of the gynecologist.

## 2024-06-14 NOTE — TELEPHONE ENCOUNTER
----- Message from Briseida Brito sent at 6/13/2024  3:02 PM CDT -----  Regarding: RE: Surgery Today  Brie,     It is my pleasure to help her and all our patients in need. Thank you so much for your kind words.     Sincerely,     Briseida Brito  ----- Message -----  From: Brie Correa LPN  Sent: 6/13/2024   1:36 PM CDT  To: Briseida Brito  Subject: Surgery Today                                    Good Afternoon,    This eulalia lady had her surgery today which took a lot longer than they thought due to the severity of her case.  So, she needed the surgery much more than realized.   Thanks to you, she will able to get the medical care she much needed.  Thank You for what you are doing,    Brie

## 2024-06-15 LAB
ALBUMIN SERPL-MCNC: 2.8 G/DL (ref 3.5–5)
ALBUMIN/GLOB SERPL: 0.9 RATIO (ref 1.1–2)
ALP SERPL-CCNC: 54 UNIT/L (ref 40–150)
ALT SERPL-CCNC: 14 UNIT/L (ref 0–55)
ANION GAP SERPL CALC-SCNC: 6 MEQ/L
ANISOCYTOSIS BLD QL SMEAR: ABNORMAL
AST SERPL-CCNC: 32 UNIT/L (ref 5–34)
BILIRUB SERPL-MCNC: 0.4 MG/DL
BUN SERPL-MCNC: 10.8 MG/DL (ref 7–18.7)
CALCIUM SERPL-MCNC: 8.1 MG/DL (ref 8.4–10.2)
CHLORIDE SERPL-SCNC: 114 MMOL/L (ref 98–107)
CO2 SERPL-SCNC: 19 MMOL/L (ref 22–29)
CREAT SERPL-MCNC: 0.78 MG/DL (ref 0.55–1.02)
CREAT/UREA NIT SERPL: 14
ERYTHROCYTE [DISTWIDTH] IN BLOOD BY AUTOMATED COUNT: 20.9 % (ref 11.5–17)
GFR SERPLBLD CREATININE-BSD FMLA CKD-EPI: >60 ML/MIN/1.73/M2
GLOBULIN SER-MCNC: 3 GM/DL (ref 2.4–3.5)
GLUCOSE SERPL-MCNC: 107 MG/DL (ref 74–100)
HCT VFR BLD AUTO: 27 % (ref 37–47)
HGB BLD-MCNC: 7.8 G/DL (ref 12–16)
HOLD SPECIMEN: NORMAL
HYPOCHROMIA BLD QL SMEAR: ABNORMAL
MCH RBC QN AUTO: 23 PG (ref 27–31)
MCHC RBC AUTO-ENTMCNC: 28.9 G/DL (ref 33–36)
MCV RBC AUTO: 79.6 FL (ref 80–94)
NRBC BLD AUTO-RTO: 0 %
PLATELET # BLD AUTO: 181 X10(3)/MCL (ref 130–400)
PLATELET # BLD EST: ADEQUATE 10*3/UL
PMV BLD AUTO: 9.4 FL (ref 7.4–10.4)
POIKILOCYTOSIS BLD QL SMEAR: ABNORMAL
POTASSIUM SERPL-SCNC: 3.7 MMOL/L (ref 3.5–5.1)
PROT SERPL-MCNC: 5.8 GM/DL (ref 6.4–8.3)
RBC # BLD AUTO: 3.39 X10(6)/MCL (ref 4.2–5.4)
RBC MORPH BLD: ABNORMAL
SODIUM SERPL-SCNC: 139 MMOL/L (ref 136–145)
WBC # BLD AUTO: 8.86 X10(3)/MCL (ref 4.5–11.5)

## 2024-06-15 PROCEDURE — 11000001 HC ACUTE MED/SURG PRIVATE ROOM

## 2024-06-15 PROCEDURE — 36415 COLL VENOUS BLD VENIPUNCTURE: CPT

## 2024-06-15 PROCEDURE — 25000003 PHARM REV CODE 250: Performed by: STUDENT IN AN ORGANIZED HEALTH CARE EDUCATION/TRAINING PROGRAM

## 2024-06-15 PROCEDURE — 85027 COMPLETE CBC AUTOMATED: CPT

## 2024-06-15 PROCEDURE — 80053 COMPREHEN METABOLIC PANEL: CPT

## 2024-06-15 PROCEDURE — 25000003 PHARM REV CODE 250

## 2024-06-15 PROCEDURE — 94799 UNLISTED PULMONARY SVC/PX: CPT

## 2024-06-15 PROCEDURE — 99900035 HC TECH TIME PER 15 MIN (STAT)

## 2024-06-15 PROCEDURE — 94761 N-INVAS EAR/PLS OXIMETRY MLT: CPT

## 2024-06-15 RX ORDER — DOCUSATE SODIUM 100 MG/1
100 CAPSULE, LIQUID FILLED ORAL 2 TIMES DAILY
Qty: 28 CAPSULE | Refills: 0 | Status: SHIPPED | OUTPATIENT
Start: 2024-06-15 | End: 2024-06-29

## 2024-06-15 RX ORDER — DOCUSATE SODIUM 100 MG/1
100 CAPSULE, LIQUID FILLED ORAL DAILY
Status: DISCONTINUED | OUTPATIENT
Start: 2024-06-15 | End: 2024-06-16 | Stop reason: HOSPADM

## 2024-06-15 RX ORDER — IBUPROFEN 800 MG/1
800 TABLET ORAL EVERY 8 HOURS
Qty: 90 TABLET | Refills: 0 | Status: SHIPPED | OUTPATIENT
Start: 2024-06-15 | End: 2024-07-15

## 2024-06-15 RX ORDER — OXYCODONE HYDROCHLORIDE 5 MG/1
5 TABLET ORAL EVERY 4 HOURS PRN
Qty: 20 TABLET | Refills: 0 | Status: SHIPPED | OUTPATIENT
Start: 2024-06-15

## 2024-06-15 RX ORDER — ACETAMINOPHEN 500 MG
1000 TABLET ORAL EVERY 8 HOURS
Qty: 180 TABLET | Refills: 0 | Status: SHIPPED | OUTPATIENT
Start: 2024-06-15 | End: 2024-07-15

## 2024-06-15 RX ORDER — PHENAZOPYRIDINE HYDROCHLORIDE 100 MG/1
200 TABLET, FILM COATED ORAL
Status: DISCONTINUED | OUTPATIENT
Start: 2024-06-15 | End: 2024-06-16 | Stop reason: HOSPADM

## 2024-06-15 RX ADMIN — MUPIROCIN: 20 OINTMENT TOPICAL at 09:06

## 2024-06-15 RX ADMIN — IBUPROFEN 800 MG: 400 TABLET, FILM COATED ORAL at 02:06

## 2024-06-15 RX ADMIN — FAMOTIDINE 20 MG: 10 INJECTION, SOLUTION INTRAVENOUS at 08:06

## 2024-06-15 RX ADMIN — FAMOTIDINE 20 MG: 10 INJECTION, SOLUTION INTRAVENOUS at 09:06

## 2024-06-15 RX ADMIN — DOCUSATE SODIUM 100 MG: 100 CAPSULE, LIQUID FILLED ORAL at 11:06

## 2024-06-15 RX ADMIN — ACETAMINOPHEN 1000 MG: 500 TABLET ORAL at 06:06

## 2024-06-15 RX ADMIN — OXYCODONE HYDROCHLORIDE 5 MG: 5 TABLET ORAL at 08:06

## 2024-06-15 RX ADMIN — ACETAMINOPHEN 1000 MG: 500 TABLET ORAL at 09:06

## 2024-06-15 RX ADMIN — IBUPROFEN 800 MG: 400 TABLET, FILM COATED ORAL at 06:06

## 2024-06-15 RX ADMIN — PHENAZOPYRIDINE HYDROCHLORIDE 200 MG: 100 TABLET ORAL at 04:06

## 2024-06-15 RX ADMIN — IBUPROFEN 800 MG: 400 TABLET, FILM COATED ORAL at 09:06

## 2024-06-15 RX ADMIN — MUPIROCIN: 20 OINTMENT TOPICAL at 08:06

## 2024-06-15 NOTE — PLAN OF CARE
Problem: Adult Inpatient Plan of Care  Goal: Plan of Care Review  6/15/2024 0501 by Gris Calderon LPN  Outcome: Progressing  6/15/2024 0249 by Gris Calderon LPN  Outcome: Progressing  Goal: Patient-Specific Goal (Individualized)  6/15/2024 0501 by Gris Calderon LPN  Outcome: Progressing  6/15/2024 0249 by Gris Calderon LPN  Outcome: Progressing  Goal: Absence of Hospital-Acquired Illness or Injury  6/15/2024 0501 by Gris Calderon LPN  Outcome: Progressing  6/15/2024 0249 by Gris Calderon LPN  Outcome: Progressing  Goal: Optimal Comfort and Wellbeing  6/15/2024 0501 by Gris Calderon LPN  Outcome: Progressing  6/15/2024 0249 by Gris Calderon LPN  Outcome: Progressing  Goal: Readiness for Transition of Care  6/15/2024 0501 by Gris Calderon LPN  Outcome: Progressing  6/15/2024 0249 by Gris Calderon LPN  Outcome: Progressing     Problem: Infection  Goal: Absence of Infection Signs and Symptoms  6/15/2024 0501 by Gris Calderon LPN  Outcome: Progressing  6/15/2024 0249 by Gris Calderon LPN  Outcome: Progressing     Problem: Wound  Goal: Optimal Coping  6/15/2024 0501 by Gris Calderon LPN  Outcome: Progressing  6/15/2024 0249 by Gris Calderon LPN  Outcome: Progressing  Goal: Optimal Functional Ability  6/15/2024 0501 by Gris Calderon LPN  Outcome: Progressing  6/15/2024 0249 by Gris Calderon LPN  Outcome: Progressing  Goal: Absence of Infection Signs and Symptoms  6/15/2024 0501 by Gris Calderon LPN  Outcome: Progressing  6/15/2024 0249 by Gris Calderon LPN  Outcome: Progressing  Goal: Improved Oral Intake  6/15/2024 0501 by Gris Calderon LPN  Outcome: Progressing  6/15/2024 0249 by Gris Calderon LPN  Outcome: Progressing  Goal: Optimal Pain Control and Function  6/15/2024 0501 by Gris Calderon LPN  Outcome: Progressing  6/15/2024 0249 by Gris Calderon LPN  Outcome: Progressing  Goal: Skin Health and  Integrity  6/15/2024 0501 by Gris Calderon LPN  Outcome: Progressing  6/15/2024 0249 by Gris Calderon LPN  Outcome: Progressing  Goal: Optimal Wound Healing  6/15/2024 0501 by Gris Calderon LPN  Outcome: Progressing  6/15/2024 0249 by Gris Calderon LPN  Outcome: Progressing     Problem: Fall Injury Risk  Goal: Absence of Fall and Fall-Related Injury  6/15/2024 0501 by Gris Calderon LPN  Outcome: Progressing  6/15/2024 0249 by Gris Calderon LPN  Outcome: Progressing

## 2024-06-15 NOTE — DISCHARGE SUMMARY
[facility] Telemetry  Obstetrics & Gynecology  Discharge Summary    Patient Name: Eileen Clarke  MRN: [id number]  Admission Date: 6/13/2024  Hospital Length of Stay: 3 days  Discharge Date and Time:  06/16/2024 9:50 AM  Attending Physician: Elle Kimball MD   Discharging Provider: Jeff Garcia MD  Primary Care Provider: Khushi, Primary Doctor    Hospital Course: 47 y/o female presented for scheduled surgery on 6/13 in setting of AUB and uteromegaly. She underwent THALIA/BS/Cysto with left ureteral stent placement given concern for delayed thermal injury for enlarged uterus. She was admitted for post-op recovery following major abdominal surgery and progressed appropriately. On POD#2, she was tolerating regular diet, passing flatus, ambulating without difficulty, voiding spontaneously, and with pain well-controlled with PO meds. On POD##, she had a bowel movement. She was determined to be meeting all post-op milestones and discharged on POD#3.    Procedure(s) (LRB):  HYSTERECTOMY, TOTAL, ABDOMINAL, SALPINGECTOMY, WITH CYSTOSCOPY (N/A)  INSERTION, STENT, URETER (Left)     Consults: Intra-operative consult to urology for left ureteral stent placement given concern for delayed thermal injury for enlarged uterus.    Significant Diagnostic Studies: Labs: CBC and CMP remained stable, drop in H/H appropriate for blood loss.    Pending Diagnostic Studies:       None          Final Active Diagnoses:    Diagnosis Date Noted POA    PRINCIPAL PROBLEM:  Status post total abdominal hysterectomy [Z90.710] 06/13/2024 No      Problems Resolved During this Admission:        Discharged Condition: stable    Physical Exam prior to discharge:   General appearance: alert, appears stated age, and cooperative  Lungs: clear to auscultation bilaterally  Heart: regular rate and rhythm, S1, S2 normal, no murmur, click, rub or gallop  Abdomen: soft, ND, minimally tender, appropriate postop, incision healing well with staples  in place, no erythema or discharge, active bowel sounds  Pelvic: No vaginal bleeding  Extremities: No evidence of DVT seen on physical exam.    Disposition: Home or Self Care    Follow Up:   Follow-up Information       [facility] - [facility] Follow up.    Specialty: Gynecology  Why: Follow up as scheduled for post-op visits on Friday 6/21 (incision check and staple removal), 6/28, and 7/26.  Contact information:  [address] [facility] [address] [phone]                         Patient Instructions:      Diet Adult Regular     Pelvic Rest   Order Comments: Nothing in the vagina for 6 weeks.  No heavy lifting (nothing heavier than a gallon of milk).  No soaking in baths, jacuzzis, or hot tubs for 6 weeks, only regular showers for 6 weeks.  No driving while taking narcotic medications.     Notify your health care provider if you experience any of the following:  temperature >100.4     Notify your health care provider if you experience any of the following:  persistent nausea and vomiting or diarrhea     Notify your health care provider if you experience any of the following:  severe uncontrolled pain     Notify your health care provider if you experience any of the following:  redness, tenderness, or signs of infection (pain, swelling, redness, odor or green/yellow discharge around incision site)     Notify your health care provider if you experience any of the following:  difficulty breathing or increased cough     Notify your health care provider if you experience any of the following:  severe persistent headache     Notify your health care provider if you experience any of the following:  worsening rash     Notify your health care provider if you experience any of the following:  persistent dizziness, light-headedness, or visual disturbances     Notify your health care provider if you experience any of the following:  increased confusion or weakness     No dressing needed      Medications:  Reconciled Home Medications:      Medication List        START taking these medications      acetaminophen 500 MG tablet  Commonly known as: TYLENOL  Take 2 tablets (1,000 mg total) by mouth every 8 (eight) hours.     docusate sodium 100 MG capsule  Commonly known as: COLACE  Take 1 capsule (100 mg total) by mouth 2 (two) times daily. for 14 days     ibuprofen 800 MG tablet  Commonly known as: ADVIL,MOTRIN  Take 1 tablet (800 mg total) by mouth every 8 (eight) hours.     oxyCODONE 5 MG immediate release tablet  Commonly known as: ROXICODONE  Take 1 tablet (5 mg total) by mouth every 4 (four) hours as needed for Pain.            Be Gill MD  [facility], PGY-2

## 2024-06-15 NOTE — PROGRESS NOTES
[facility] INPATIENT NOTE    Subjective:       Eileen Clarke is a 48 y.o.  s/p THALIA/BS/Cysto with stent placement given concern for delayed thermal injury for enlarged uterus.  POD: 2 Hospital Day: 3    Patient is ambulating without issues. Tolerating regular diet without nausea or emesis. Pain well controlled with PO medications. Patient is voiding spontaneously without dysuria or difficulty, urine with red-brown tinge likely 2/2 left ureteral stent. Reports passing flatus. Denies chest pain, shortness or breath, fevers, chills, weakness or dizziness.    Objective:     VITAL SIGNS: 24 HR MIN & MAX Most Recent Vitals 24 HR Intake & Output   Temp  Min: 97.8 °F (36.6 °C)  Max: 98.7 °F (37.1 °C)  98.6 °F (37 °C)  0701 - 06/15 0700  In: -   Out:  [Urine:2050]   BP  Min: 117/75  Max: 127/77  127/77     Pulse  Min: 77  Max: 89  89     Resp  Min: 18  Max: 18  18     SpO2  Min: 95 %  Max: 97 %  97 %       BP  Min: 117/75  Max: 127/77  Temp  Av.4 °F (36.9 °C)  Min: 97.8 °F (36.6 °C)  Max: 98.7 °F (37.1 °C)  Pulse  Av  Min: 77  Max: 89  Resp  Av  Min: 18  Max: 18  SpO2  Av %  Min: 95 %  Max: 97 %    Body mass index is 32.92 kg/m².    I/O last 3 completed shifts:  In: -   Out: 2850 [Urine:2850]      Intake/Output Summary (Last 24 hours) at 6/15/2024 0950  Last data filed at 2024 2100  Gross per 24 hour   Intake --   Output 2050 ml   Net -2050 ml     Physical Exam:   General appearance: alert, appears stated age, and cooperative  Lungs: clear to auscultation bilaterally  Heart: regular rate and rhythm, S1, S2 normal, no murmur, click, rub or gallop  Abdomen: soft, ND, mildly tender appropriate postop, incision healing well with staples in place, no erythema or discharge, active bowel sounds  Pelvic: No vaginal bleeding  Extremities: No evidence of DVT seen on physical exam.    Labs:   Recent Labs   Lab 24  1312 24  0316 06/15/24  0446   WBC 16.44* 14.38* 8.86   HGB 9.8*  8.8* 7.8*   HCT 33.5* 28.7* 27.0*    233 181   MCV 80.0 76.7* 79.6*   RDW 20.6* 20.3* 20.9*   NA  --  136 139   K  --  4.0 3.7   CL  --  112* 114*   CO2  --  18* 19*   BUN  --  16.6 10.8   CREATININE  --  0.93 0.78   CALCIUM  --  8.0* 8.1*   ALBUMIN  --  3.0* 2.8*   BILITOT  --  0.8 0.4   AST  --  25 32   ALT  --  13 14   ALKPHOS  --  55 54     Recent Results (from the past 24 hour(s))   CBC Without Differential    Collection Time: 06/15/24  4:46 AM   Result Value Ref Range    WBC 8.86 4.50 - 11.50 x10(3)/mcL    RBC 3.39 (L) 4.20 - 5.40 x10(6)/mcL    Hgb 7.8 (L) 12.0 - 16.0 g/dL    Hct 27.0 (L) 37.0 - 47.0 %    MCV 79.6 (L) 80.0 - 94.0 fL    MCH 23.0 (L) 27.0 - 31.0 pg    MCHC 28.9 (L) 33.0 - 36.0 g/dL    RDW 20.9 (H) 11.5 - 17.0 %    Platelet 181 130 - 400 x10(3)/mcL    MPV 9.4 7.4 - 10.4 fL    NRBC% 0.0 %   Comprehensive Metabolic Panel    Collection Time: 06/15/24  4:46 AM   Result Value Ref Range    Sodium 139 136 - 145 mmol/L    Potassium 3.7 3.5 - 5.1 mmol/L    Chloride 114 (H) 98 - 107 mmol/L    CO2 19 (L) 22 - 29 mmol/L    Glucose 107 (H) 74 - 100 mg/dL    Blood Urea Nitrogen 10.8 7.0 - 18.7 mg/dL    Creatinine 0.78 0.55 - 1.02 mg/dL    Calcium 8.1 (L) 8.4 - 10.2 mg/dL    Protein Total 5.8 (L) 6.4 - 8.3 gm/dL    Albumin 2.8 (L) 3.5 - 5.0 g/dL    Globulin 3.0 2.4 - 3.5 gm/dL    Albumin/Globulin Ratio 0.9 (L) 1.1 - 2.0 ratio    Bilirubin Total 0.4 <=1.5 mg/dL    ALP 54 40 - 150 unit/L    ALT 14 0 - 55 unit/L    AST 32 5 - 34 unit/L    eGFR >60 mL/min/1.73/m2    Anion Gap 6.0 mEq/L    BUN/Creatinine Ratio 14    Gold Top Hold    Collection Time: 06/15/24  4:46 AM   Result Value Ref Range    Extra Tube Hold for add-ons.    Blood Smear Microscopic Exam    Collection Time: 06/15/24  4:46 AM   Result Value Ref Range    RBC Morph Abnormal (A) Normal    Anisocytosis 1+ (A) (none)    Hypochromasia 1+ (A) (none)    Poikilocytosis 1+ (A) (none)    Platelets Adequate Normal, Adequate     Pathology:  Pending    Assessment/Plan:     48 y.o.  s/p THALIA/BS/Cysto with intra-op left ureteral stent placement by urology for prevention of delayed thermal injury. Doing well postop. POD#2.    - Pain well controlled on current regimen.  - Normotensive, not tachycardic.  - Satting well on RA  - Diet Adult Regular  - Preop H/H 11.2/37--> QBL 1100 mL --> 8.8/28.7 --> 7.8/27. Stable. No sx's of anemia.  - S/p intra-op left ureteral stent by urology. Voiding spontaneously, urine with appropriate red-brown tinge, getting lighter. Cr 0.78 today, wnl.    Disposition:   Anticipated disposition plan: Possible PM discharge today vs tomorrow in AM.  She is meeting all milestones for discharge at this time.  Will schedule for incision check and staple removal on .    Discussed with Dr. Garcia.    Be Gill MD  [facility], PGY-2

## 2024-06-16 VITALS
TEMPERATURE: 98 F | HEART RATE: 77 BPM | DIASTOLIC BLOOD PRESSURE: 75 MMHG | HEIGHT: 62 IN | OXYGEN SATURATION: 99 % | WEIGHT: 180 LBS | SYSTOLIC BLOOD PRESSURE: 129 MMHG | BODY MASS INDEX: 33.13 KG/M2 | RESPIRATION RATE: 18 BRPM

## 2024-06-16 LAB
ALBUMIN SERPL-MCNC: 2.7 G/DL (ref 3.5–5)
ALBUMIN/GLOB SERPL: 0.9 RATIO (ref 1.1–2)
ALP SERPL-CCNC: 50 UNIT/L (ref 40–150)
ALT SERPL-CCNC: 14 UNIT/L (ref 0–55)
ANION GAP SERPL CALC-SCNC: 7 MEQ/L
AST SERPL-CCNC: 26 UNIT/L (ref 5–34)
BILIRUB SERPL-MCNC: 0.4 MG/DL
BUN SERPL-MCNC: 8.5 MG/DL (ref 7–18.7)
CALCIUM SERPL-MCNC: 8.2 MG/DL (ref 8.4–10.2)
CHLORIDE SERPL-SCNC: 113 MMOL/L (ref 98–107)
CO2 SERPL-SCNC: 20 MMOL/L (ref 22–29)
CREAT SERPL-MCNC: 0.69 MG/DL (ref 0.55–1.02)
CREAT/UREA NIT SERPL: 12
ERYTHROCYTE [DISTWIDTH] IN BLOOD BY AUTOMATED COUNT: 20.6 % (ref 11.5–17)
GFR SERPLBLD CREATININE-BSD FMLA CKD-EPI: >60 ML/MIN/1.73/M2
GLOBULIN SER-MCNC: 3 GM/DL (ref 2.4–3.5)
GLUCOSE SERPL-MCNC: 95 MG/DL (ref 74–100)
HCT VFR BLD AUTO: 25.3 % (ref 37–47)
HGB BLD-MCNC: 7.4 G/DL (ref 12–16)
HOLD SPECIMEN: NORMAL
MCH RBC QN AUTO: 23.3 PG (ref 27–31)
MCHC RBC AUTO-ENTMCNC: 29.2 G/DL (ref 33–36)
MCV RBC AUTO: 79.8 FL (ref 80–94)
NRBC BLD AUTO-RTO: 0 %
PLATELET # BLD AUTO: 178 X10(3)/MCL (ref 130–400)
PMV BLD AUTO: 9.7 FL (ref 7.4–10.4)
POTASSIUM SERPL-SCNC: 3.5 MMOL/L (ref 3.5–5.1)
PROT SERPL-MCNC: 5.7 GM/DL (ref 6.4–8.3)
RBC # BLD AUTO: 3.17 X10(6)/MCL (ref 4.2–5.4)
SODIUM SERPL-SCNC: 140 MMOL/L (ref 136–145)
WBC # BLD AUTO: 8.12 X10(3)/MCL (ref 4.5–11.5)

## 2024-06-16 PROCEDURE — 85027 COMPLETE CBC AUTOMATED: CPT

## 2024-06-16 PROCEDURE — 80053 COMPREHEN METABOLIC PANEL: CPT

## 2024-06-16 PROCEDURE — 25000003 PHARM REV CODE 250: Performed by: STUDENT IN AN ORGANIZED HEALTH CARE EDUCATION/TRAINING PROGRAM

## 2024-06-16 PROCEDURE — 36415 COLL VENOUS BLD VENIPUNCTURE: CPT

## 2024-06-16 PROCEDURE — 94761 N-INVAS EAR/PLS OXIMETRY MLT: CPT

## 2024-06-16 PROCEDURE — 25000003 PHARM REV CODE 250

## 2024-06-16 RX ADMIN — IBUPROFEN 800 MG: 400 TABLET, FILM COATED ORAL at 06:06

## 2024-06-16 RX ADMIN — DOCUSATE SODIUM 100 MG: 100 CAPSULE, LIQUID FILLED ORAL at 08:06

## 2024-06-16 RX ADMIN — PHENAZOPYRIDINE HYDROCHLORIDE 200 MG: 100 TABLET ORAL at 06:06

## 2024-06-16 RX ADMIN — ACETAMINOPHEN 1000 MG: 500 TABLET ORAL at 06:06

## 2024-06-16 RX ADMIN — FAMOTIDINE 20 MG: 10 INJECTION, SOLUTION INTRAVENOUS at 08:06

## 2024-06-17 ENCOUNTER — TELEPHONE (OUTPATIENT)
Dept: GYNECOLOGY | Facility: CLINIC | Age: 49
End: 2024-06-17
Payer: MEDICAID

## 2024-06-17 LAB
ABO + RH BLD: NORMAL
BLD PROD TYP BPU: NORMAL
BLOOD UNIT EXPIRATION DATE: NORMAL
BLOOD UNIT TYPE CODE: 5100
CROSSMATCH INTERPRETATION: NORMAL
DISPENSE STATUS: NORMAL
UNIT NUMBER: NORMAL

## 2024-06-17 NOTE — TELEPHONE ENCOUNTER
[facility]ago calling for cpt code for oxicodone filled by Dr Rios, [id number] post op pain and  THALIA. Tech took my name and credentials.

## 2024-06-20 LAB
ESTROGEN SERPL-MCNC: NORMAL PG/ML
INSULIN SERPL-ACNC: NORMAL U[IU]/ML
LAB AP CLINICAL INFORMATION: NORMAL
LAB AP GROSS DESCRIPTION: NORMAL
LAB AP REPORT FOOTNOTES: NORMAL
T3RU NFR SERPL: NORMAL %

## 2024-06-21 ENCOUNTER — HOSPITAL ENCOUNTER (OUTPATIENT)
Dept: RADIOLOGY | Facility: HOSPITAL | Age: 49
Discharge: HOME | End: 2024-06-21
Attending: OBSTETRICS & GYNECOLOGY
Payer: MEDICAID

## 2024-06-21 ENCOUNTER — OFFICE VISIT (OUTPATIENT)
Dept: GYNECOLOGY | Facility: CLINIC | Age: 49
End: 2024-06-21
Payer: MEDICAID

## 2024-06-21 VITALS — WEIGHT: 173.63 LBS | BODY MASS INDEX: 31.95 KG/M2 | HEIGHT: 62 IN

## 2024-06-21 DIAGNOSIS — I82.4Y1 ACUTE DEEP VEIN THROMBOSIS (DVT) OF PROXIMAL VEIN OF RIGHT LOWER EXTREMITY: ICD-10-CM

## 2024-06-21 DIAGNOSIS — Z09 POSTOP CHECK: ICD-10-CM

## 2024-06-21 DIAGNOSIS — I82.441 ACUTE DEEP VEIN THROMBOSIS (DVT) OF TIBIAL VEIN OF RIGHT LOWER EXTREMITY: Primary | ICD-10-CM

## 2024-06-21 DIAGNOSIS — M79.89 LOCALIZED SWELLING OF BOTH LOWER EXTREMITIES: Primary | ICD-10-CM

## 2024-06-21 PROCEDURE — 99213 OFFICE O/P EST LOW 20 MIN: CPT | Mod: PBBFAC,25

## 2024-06-21 PROCEDURE — 93970 EXTREMITY STUDY: CPT

## 2024-06-21 RX ORDER — RIVAROXABAN 15 MG-20MG
KIT ORAL
Qty: 1 EACH | Refills: 0 | Status: SHIPPED | OUTPATIENT
Start: 2024-06-21

## 2024-06-21 RX ORDER — RIVAROXABAN 15 MG-20MG
KIT ORAL
Qty: 1 EACH | Refills: 0 | Status: SHIPPED | OUTPATIENT
Start: 2024-06-21 | End: 2024-06-21

## 2024-06-21 NOTE — PROGRESS NOTES
Patient evaluated by internal medicine secondary to acute DVT.  Patient was seen in GYN clinic today for 1 week post-op s/p THALIA/BS/Cysto with stent placement given concern for delayed thermal injury 2/2 AUB and uteromegaly on 6/13/2024.  She reports increasing pain and swelling to right leg on 6/18/2024.  She received a DVT US during clinic visit showing acute occlusive-appearing thrombus within both of the right PTVs.  Patient evaluated by internal medicine.  She reports no chest pain, shortness of breath, palpitations, or orthopnea.  She endorses pain and swelling to right lower extremity.  Denies any history of blood clots or bleeding disorders.  Patient not currently established with primary care provider.   Ordering Xerelto for treatment of LE DVT and referring patient to internal medicine clinic for follow-up and establishment of care.  She was given strict ED precautions to return if she experiences any chest pain, SOB, or excessive bleeding.

## 2024-06-21 NOTE — PROGRESS NOTES
[facility] NOTE  [facility]  [address] [place] [address]  Phone: [phone]  Fax: [phone]    Postoperative Follow-Up    Subjective:     Eileen Clarke is a 48 y.o.  who presents to the clinic 1 week post-op s/p THALIA/BS/Cysto with stent placement given concern for delayed thermal injury 2/2 AUB and uteromegaly on 2024.    Eating a regular diet without difficulty with normal bowel movements. Patient is not complaining of pain currently. No episodes of vaginal bleeding. Does endorse bilateral calf pain over the past few days. Endorses left calf pain 1 week prior to surgery but now also right calf pain since discharge from hospital.    Patient's medications, allergies, past medical, surgical, social and family histories were reviewed and updated as appropriate.    Operative Findings:  EBL: 1100 mL  Uterus: 2975 g    EUA: Normal external genitalia without lesion. Uterus 22 week size, very broad but mobile. Uterus palpated through posterior vagina, smooth in contour. Intra-op, 20-22cm uterus, boggy, smooth in contour. Bilateral ovaries and tubes overall normal appearing. Round ligament on stretch given large uterine size. Left side bowel adhesions noted to lateral uterus, easily dissected off with sharp and blunt dissection.      Cystoscopy: Urethra normal without polyp or lesion. Bladder mucosa visualized globally without lesion, petechiae, diverticula, or stones in the trigone, inlet, and dome. Bilateral ureteral orifices visualized with strong efflux of urine and without lesion. See Urology op note for further findings during left stent placement.    Review of Systems  Denies fevers, chills, headache, blurry vision, nausea, vomiting, dizziness, or syncope.  Denies chest pain, shortness of breath, RUQ pain.     Objective:   There were no vitals filed for this visit.    Physical Exam:     General: Alert and oriented, in no acute distress  Lungs: Clear to auscultation bilaterally, no  conversational dyspnea  Heart: Regular rate and rhythm  Abdomen: Soft, non-distended, non tender to palpation, no involuntary guarding, no rebound tenderness  Incision: Midline vertical incision healing well, clean/dry/intact, staples in place.  Extremities: Bilateral calves tender to palpation, negative Analilia sign, no warmth/erythema.    Pathology:  Final Diagnosis      Uterus, cervix, right and left fallopian tubes, hysterectomy:  -Endometrium:  Secretory phase endometrium  -Myometrium:Leiomyoma with sclerosing fibrosis.  -Cervix:  Mild chronic cervicitis and dilated endocervical glands.  -Right and left fallopian tubes: No significant changes.               Electronically signed by Rebecca Vazuqez MD on 2024 at 1409     Assessment:     Eileen Clarke is a 48 y.o.  s/p THALIA/BS/Cysto with stent placement given concern for delayed thermal injury 2/2 AUB and uteromegaly on 2024. Right leg DVT confirmed by ultrasound today, otherwise doing well.  Operative findings again reviewed. Pathology report discussed.     Plan:     Right leg DVT:  Patient sent to ultrasound which confirmed right leg DVT.  Internal medicine consulted, will manage outpatient with eliquis.  Continue any current medications.  Wound care discussed.  Activity restrictions: Pelvic rest  Anticipated return to work: Now  Follow up: 5 weeks for post-op visit.    Patient and plan were discussed with Dr. Kimball.    Be Gill MD  [facility], PGY-2

## 2024-07-14 NOTE — PROGRESS NOTES
"[facility] Clinic Visit    Chief Complaint:      Hospital Follow Up (Postward appt: Dx: DVT of right tibial)     Subjective:     HPI:  Eileen Clarke is a 48 y.o. female with  s/p THALIA/BS/Cysto with stent placement given concern for delayed thermal injury 2/2 AUB and uteromegaly on 6/13/2024.  She received a DVT US on 06/21/2024 as she was having leg pain which showed acute occlusive-appearing thrombus within both of the right PTVs. For which she was started on Xarelta     Today, she came for her follow up  regarding DVT, she stated that she is compliant with her medication and is doing well on the medication, she still has 10 more days of her xarelta start dosage to complete. She denies no leg swelling, leg pain, chest pain, shortness of breath and  noticed no uncontrollable bleeding.    She denied any family history of coagulation disorders, prolonged immobilization, and she never had a similar complaint of DVT before           Review of Systems  Denies any chest pain, SOB, headache, N/V/D, abdominal pain, hematuria, hemetemesis, dusuria, fever    Objective:   Last 24 Hour Vital Signs:  Vitals  BP: 109/72  Temp: 98.2 °F (36.8 °C)  Temp Source: Oral  Pulse: 62  Resp: 18  SpO2: 98 %  Height: 5' 2" (157.5 cm)  Weight: 81.7 kg (180 lb 3.2 oz)    Physical Examination:  General: Awake, alert, & oriented to person, place & time. No acute distress  Psychiatric: Mood and affect normal  HEENT: Normocephalic, atraumatic. Face symmetric.  Cardiovascular: Regular rate & rhythm. Normal S1 & S2 w/out murmurs, rubs or gallops.  Pulmonary: Bilateral symmetric chest rise. Non-labored, no wheezes, rhonchi or crackles are appreciated.  Abdominal:  Nondistended. Bowel sounds present  Extremities: No clubbing, cyanosis or edema.  Skin:  Exposed skin is warm & dry.  Neuro:   Patient moves all extremities equally. Sensation intact bilateraly.    Labs  CMP:   Recent Labs   Lab 06/14/24  0316 06/15/24  0446 06/16/24  0417 "   Sodium 136 139 140   Potassium 4.0 3.7 3.5   CO2 18 L 19 L 20 L   Blood Urea Nitrogen 16.6 10.8 8.5   Creatinine 0.93 0.78 0.69   Glucose 141 H 107 H 95   Calcium 8.0 L 8.1 L 8.2 L   Albumin 3.0 L 2.8 L 2.7 L   Bilirubin Total 0.8 0.4 0.4   AST 25 32 26   ALT 13 14 14   ALP 55 54 50   ,   CBC:   Recent Labs   Lab 03/18/24  0931 06/05/24  1032 06/13/24  1312 06/14/24  0316 06/15/24  0446 06/16/24  0417   WBC 7.48   < > 16.44 H 14.38 H 8.86 8.12   Neut # 4.38  --  14.32 H  --   --   --    RBC 3.56 L   < > 4.19 L 3.74 L 3.39 L 3.17 L   Hgb 6.4 L   < > 9.8 L 8.8 L 7.8 L 7.4 L   Hct 23.7 L   < > 33.5 L 28.7 L 27.0 L 25.3 L   Platelet 269   < > 196 233 181 178   MCV 66.6 L   < > 80.0 76.7 L 79.6 L 79.8 L   RDW 23.7 H   < > 20.6 H 20.3 H 20.9 H 20.6 H    < > = values in this interval not displayed.   ,   DM:   Recent Labs   Lab 06/14/24  0316 06/15/24  0446 06/16/24  0417   Creatinine 0.93 0.78 0.69   ,   FLP:      ,   Thyroid:   Recent Labs   Lab 03/18/24  0931   TSH 2.576   ,   Anemia:   Recent Labs   Lab 06/16/24 0417   Hgb 7.4 L   Hct 25.3 L   ,   STD:   Recent Labs   Lab 03/18/24 0931   HIV Nonreactive   Syphilis Antibody Nonreactive        Assessment & Plan:   Rt lower limb DVT might be provoked 2/2  THALIA  -DVT US on 06/21/2024 showed right DVT in tibial vein  - Patient currently on Xarelto [30D start pack]  -  will continue xarelto 20mg once  for 3 months from 07/26/2024  - During her next follow up will check her D-dimers and reevaluate her need for Xarelta     Anemia  - hemoglobin is 7.4, MCV is 79.8  - oral iron therapy    left ureter stent placement 2/2 delayed thermal injury 2/2 AUB   s/p THALIA/BS/Cysto  - Follow up with urology    To be addressed at next follow-up  -D-dimers  - iron profile, ferritin      Follow-ups  -Follow-up medicine clinic in 3 months      DOT LAYNE MD  Internal Medicine - PGY-1

## 2024-07-15 ENCOUNTER — OFFICE VISIT (OUTPATIENT)
Dept: INTERNAL MEDICINE | Facility: CLINIC | Age: 49
End: 2024-07-15

## 2024-07-15 VITALS
DIASTOLIC BLOOD PRESSURE: 72 MMHG | RESPIRATION RATE: 18 BRPM | BODY MASS INDEX: 33.16 KG/M2 | HEIGHT: 62 IN | OXYGEN SATURATION: 98 % | HEART RATE: 62 BPM | WEIGHT: 180.19 LBS | SYSTOLIC BLOOD PRESSURE: 109 MMHG | TEMPERATURE: 98 F

## 2024-07-15 DIAGNOSIS — D64.9 ANEMIA, UNSPECIFIED TYPE: ICD-10-CM

## 2024-07-15 DIAGNOSIS — I82.441 ACUTE DEEP VEIN THROMBOSIS (DVT) OF TIBIAL VEIN OF RIGHT LOWER EXTREMITY: ICD-10-CM

## 2024-07-15 DIAGNOSIS — E66.9 CLASS 1 OBESITY WITH BODY MASS INDEX (BMI) OF 33.0 TO 33.9 IN ADULT, UNSPECIFIED OBESITY TYPE, UNSPECIFIED WHETHER SERIOUS COMORBIDITY PRESENT: Primary | ICD-10-CM

## 2024-07-15 PROBLEM — I82.409 DEEP VEIN THROMBOSIS (DVT) OF LOWER EXTREMITY: Status: ACTIVE | Noted: 2024-07-15

## 2024-07-15 PROCEDURE — 99214 OFFICE O/P EST MOD 30 MIN: CPT | Mod: PBBFAC | Performed by: INTERNAL MEDICINE

## 2024-07-16 RX ORDER — FERROUS SULFATE 325(65) MG
325 TABLET ORAL DAILY
Qty: 90 TABLET | Refills: 0 | Status: SHIPPED | OUTPATIENT
Start: 2024-07-16

## 2024-07-26 ENCOUNTER — OFFICE VISIT (OUTPATIENT)
Dept: GYNECOLOGY | Facility: CLINIC | Age: 49
End: 2024-07-26

## 2024-07-26 ENCOUNTER — LAB VISIT (OUTPATIENT)
Dept: LAB | Facility: HOSPITAL | Age: 49
End: 2024-07-26
Attending: STUDENT IN AN ORGANIZED HEALTH CARE EDUCATION/TRAINING PROGRAM

## 2024-07-26 VITALS
SYSTOLIC BLOOD PRESSURE: 114 MMHG | RESPIRATION RATE: 18 BRPM | HEIGHT: 62 IN | DIASTOLIC BLOOD PRESSURE: 72 MMHG | OXYGEN SATURATION: 99 % | TEMPERATURE: 98 F | WEIGHT: 179.81 LBS | HEART RATE: 62 BPM | BODY MASS INDEX: 33.09 KG/M2

## 2024-07-26 DIAGNOSIS — D64.9 ANEMIA, UNSPECIFIED TYPE: ICD-10-CM

## 2024-07-26 DIAGNOSIS — N93.9 ABNORMAL UTERINE BLEEDING (AUB): Primary | ICD-10-CM

## 2024-07-26 LAB
BASOPHILS # BLD AUTO: 0.06 X10(3)/MCL
BASOPHILS NFR BLD AUTO: 0.8 %
EOSINOPHIL # BLD AUTO: 0.16 X10(3)/MCL (ref 0–0.9)
EOSINOPHIL NFR BLD AUTO: 2.1 %
ERYTHROCYTE [DISTWIDTH] IN BLOOD BY AUTOMATED COUNT: 18.3 % (ref 11.5–17)
HCT VFR BLD AUTO: 38 % (ref 37–47)
HGB BLD-MCNC: 11.7 G/DL (ref 12–16)
IMM GRANULOCYTES # BLD AUTO: 0.01 X10(3)/MCL (ref 0–0.04)
IMM GRANULOCYTES NFR BLD AUTO: 0.1 %
LYMPHOCYTES # BLD AUTO: 2.82 X10(3)/MCL (ref 0.6–4.6)
LYMPHOCYTES NFR BLD AUTO: 36.6 %
MCH RBC QN AUTO: 24.5 PG (ref 27–31)
MCHC RBC AUTO-ENTMCNC: 30.8 G/DL (ref 33–36)
MCV RBC AUTO: 79.5 FL (ref 80–94)
MONOCYTES # BLD AUTO: 0.53 X10(3)/MCL (ref 0.1–1.3)
MONOCYTES NFR BLD AUTO: 6.9 %
NEUTROPHILS # BLD AUTO: 4.12 X10(3)/MCL (ref 2.1–9.2)
NEUTROPHILS NFR BLD AUTO: 53.5 %
NRBC BLD AUTO-RTO: 0 %
PLATELET # BLD AUTO: 238 X10(3)/MCL (ref 130–400)
PMV BLD AUTO: 9 FL (ref 7.4–10.4)
RBC # BLD AUTO: 4.78 X10(6)/MCL (ref 4.2–5.4)
WBC # BLD AUTO: 7.7 X10(3)/MCL (ref 4.5–11.5)

## 2024-07-26 PROCEDURE — 85025 COMPLETE CBC W/AUTO DIFF WBC: CPT

## 2024-07-26 PROCEDURE — 36415 COLL VENOUS BLD VENIPUNCTURE: CPT

## 2024-07-26 PROCEDURE — 99213 OFFICE O/P EST LOW 20 MIN: CPT | Mod: PBBFAC

## 2024-07-29 NOTE — PROGRESS NOTES
"Subjective:      Postoperative Follow-up     Eileen Clarke is a 49 y.o.  who presents to the clinic 6 weeks status post THALIA/BS/Cystoscopy, complicated by concern for delayed thermal ureteral injury s/p intraoperative Urology consult and insertion of ureteral stent, as well as post-operative DVT, currently on Xarelto. Eating a regular diet without difficulty. Voiding spontaneously without issue, and bowel movements are normal. Pain is controlled without any medications.    Patient's medications, allergies, past medical, surgical, social and family histories were reviewed and updated as appropriate.    Review of Systems  Pertinent items are noted in HPI.     PATHOLOGY:   Uterus, cervix, right and left fallopian tubes, hysterectomy:  -Endometrium:  Secretory phase endometrium  -Myometrium:Leiomyoma with sclerosing fibrosis.  -Cervix:  Mild chronic cervicitis and dilated endocervical glands.  -Right and left fallopian tubes: No significant changes.       Objective:      /72 (BP Location: Left arm, Patient Position: Sitting, BP Method: Small (Automatic))   Pulse 62   Temp 97.6 °F (36.4 °C) (Oral)   Resp 18   Ht 5' 2" (1.575 m)   Wt 81.6 kg (179 lb 12.8 oz)   LMP 05/15/2024 (Approximate) Comment: THALIA 24  SpO2 99%   BMI 32.89 kg/m²   General:  alert,appears stated age,cooperative   Abdomen: soft, non-tender   Incision:  infraumbilical midline vertical incision well re-approximated without drainage or surrounding erythema   SSE: vaginal vault without discharge or blood; vaginal cuff visually closed without visible suture, granulation tissue, or erythema   SVE: vaginal cuff non-tender and closed on palpation      Assessment:     49 y.o.  s/p THALIA/BS/Cystoscopy, complicated by concern for delayed thermal ureteral injury s/p intraoperative Urology consult and insertion of ureteral stent, as well as post-operative DVT, currently on Xarelto doing well post-operatively. Operative findings again " reviewed. Pathology report discussed.    1. Abnormal uterine bleeding (AUB)        2. Anemia, unspecified type  CBC Auto Differential        Plan:     1. Continue any current medications.  2. Wound care discussed.  3. Activity restrictions: none  4. Anticipated return to work: now.  5. In terms of anemia associated with AUB-L, most recent H/H 7.4/25.3 on 06/16/24. She denies signs/symptoms of anemia at this time. Will obtain repeat CBC today in order to assess for need for continued supplementation vs. transfusion.  6. In terms of the concern for delayed thermal ureteral injury with ureteral stent in place, she is asymptomatic at this time. She has a follow-up appointment scheduled with Dr. Negrete on 07/31/24.  7. In terms of the DVT, she recently completed a 30-day start pack of Xarelto with plan to transition to 20mg qD x 3 mo per Internal Medicine. She was unable to fill the prescription for the 3-month supply; Rx resent to her preferred pharmacy. Next follow-up appointment with Internal Medicine scheduled on 08/13/24.  8. Follow up: 1  year  for annual WWE or earlier as needed.    Problem List Items Addressed This Visit          Renal/    Abnormal uterine bleeding (AUB) - Primary     Other Visit Diagnoses       Anemia, unspecified type        Relevant Orders    CBC Auto Differential (Completed)          Patient and plan discussed with Dr. Kimball.    Fina Amos MD  [facility], PGY-4

## 2024-07-31 ENCOUNTER — PROCEDURE VISIT (OUTPATIENT)
Dept: UROLOGY | Facility: CLINIC | Age: 49
End: 2024-07-31

## 2024-07-31 VITALS
HEIGHT: 62 IN | BODY MASS INDEX: 33.1 KG/M2 | WEIGHT: 179.88 LBS | DIASTOLIC BLOOD PRESSURE: 72 MMHG | TEMPERATURE: 98 F | SYSTOLIC BLOOD PRESSURE: 111 MMHG | HEART RATE: 62 BPM

## 2024-07-31 DIAGNOSIS — Z96.0 URETERAL STENT PRESENT: Primary | ICD-10-CM

## 2024-07-31 DIAGNOSIS — N13.39 OTHER HYDRONEPHROSIS: ICD-10-CM

## 2024-07-31 LAB
BILIRUB SERPL-MCNC: NEGATIVE MG/DL
BLOOD URINE, POC: NORMAL
COLOR, POC UA: NORMAL
GLUCOSE UR QL STRIP: NEGATIVE
KETONES UR QL STRIP: NEGATIVE
LEUKOCYTE ESTERASE URINE, POC: NORMAL
NITRITE, POC UA: NEGATIVE
PH, POC UA: 5.5
PROTEIN, POC: NEGATIVE
SPECIFIC GRAVITY, POC UA: 1.01
UROBILINOGEN, POC UA: 0.2

## 2024-07-31 PROCEDURE — 52310 CYSTOSCOPY AND TREATMENT: CPT | Mod: PBBFAC | Performed by: UROLOGY

## 2024-07-31 PROCEDURE — 81001 URINALYSIS AUTO W/SCOPE: CPT | Mod: PBBFAC | Performed by: UROLOGY

## 2024-07-31 RX ORDER — CIPROFLOXACIN 500 MG/1
500 TABLET ORAL
Status: COMPLETED | OUTPATIENT
Start: 2024-07-31 | End: 2024-07-31

## 2024-07-31 RX ORDER — LIDOCAINE HYDROCHLORIDE 20 MG/ML
JELLY TOPICAL
Status: COMPLETED | OUTPATIENT
Start: 2024-07-31 | End: 2024-07-31

## 2024-07-31 RX ADMIN — CIPROFLOXACIN 500 MG: 500 TABLET ORAL at 01:07

## 2024-07-31 RX ADMIN — LIDOCAINE HYDROCHLORIDE: 20 JELLY TOPICAL at 01:07

## 2024-07-31 NOTE — PROGRESS NOTES
Pt seen by Dr. Smiley. Cysto for stent removal performed in clinic. Consents signed and obtained by staff. Pt received medication per procedure protocol. Ciprofloxacin HCl tablet 500 mg & LIDOcaine HCl 2% urojet administered and tolerated well RTC 3 months. Pt education given both written and verbal.

## 2024-07-31 NOTE — PROCEDURES
CC:  Cystoscopy    HPI:  Eileen Clarke is a 49 y.o. female here for cystoscopy for stent removal.  She had a stent placed intraoperatively during a GYN procedure when they felt that they got a little too close to the left ureter and wanted to avoid any complications of a thermal injury.  She has no complaints.    Urinalysis:  Results for orders placed or performed in visit on 07/31/24   POCT URINE DIPSTICK WITH MICROSCOPE, AUTOMATED   Result Value Ref Range    Color, UA Light Yellow     Spec Grav UA 1.010     pH, UA 5.5     WBC, UA Trace     Nitrite, UA Negative     Protein, POC Negative     Glucose, UA Negative     Ketones, UA Negative     Urobilinogen, UA 0.2     Bilirubin, POC Negative     Blood, UA Moderate        Microscopic Urinalysis:  WBC:   1-2 per HPF     RBC:    3-6 per HPF     Bacteria:    None per HPF     Squamous epithelial cells:    None per HPF  Crystals:   None    ROS:  All systems reviewed and are negative except as documented in HPI and/or Assessment and Plan.     Patient Active Problem List:     Patient Active Problem List   Diagnosis    Abnormal uterine bleeding (AUB)    Pelvic mass    Cervical cancer screening    Routine screening for STI (sexually transmitted infection)    Class 1 obesity with body mass index (BMI) of 33.0 to 33.9 in adult    Status post total abdominal hysterectomy    Deep vein thrombosis (DVT) of lower extremity        Past Medical History:  Past Medical History:   Diagnosis Date    Anemia     Class 1 obesity     BMI 33    Deep vein thrombosis (DVT) of lower extremity 07/15/2024        Past Surgical History:  Past Surgical History:   Procedure Laterality Date    HYSTERECTOMY, TOTAL, ABDOMINAL, WITH CYSTOSCOPY N/A 6/13/2024    Procedure: HYSTERECTOMY, TOTAL, ABDOMINAL, SALPINGECTOMY, WITH CYSTOSCOPY;  Surgeon: Sarah Barrera MD;  Location: [facility];  Service: OB/GYN;  Laterality: N/A;    URETERAL STENT PLACEMENT Left 6/13/2024    Procedure: INSERTION, STENT, URETER;   Surgeon: Sarah Barrera MD;  Location: [facility];  Service: OB/GYN;  Laterality: Left;        Family History:  Family History   Problem Relation Name Age of Onset    Diabetes Mother          Social History:  Social History     Socioeconomic History    Marital status:    Tobacco Use    Smoking status: Never    Smokeless tobacco: Never   Substance and Sexual Activity    Alcohol use: Never    Drug use: Never    Sexual activity: Not Currently     Partners: Male     Birth control/protection: See Surgical Hx     Social Determinants of Health     Financial Resource Strain: Low Risk  (7/15/2024)    Overall Financial Resource Strain (CARDIA)     Difficulty of Paying Living Expenses: Not very hard   Food Insecurity: No Food Insecurity (7/15/2024)    Hunger Vital Sign     Worried About Running Out of Food in the Last Year: Never true     Ran Out of Food in the Last Year: Never true   Transportation Needs: No Transportation Needs (7/15/2024)    TRANSPORTATION NEEDS     Transportation : No   Physical Activity: Inactive (7/15/2024)    Exercise Vital Sign     Days of Exercise per Week: 0 days     Minutes of Exercise per Session: 0 min   Stress: No Stress Concern Present (7/15/2024)    [employer] - Occupational Stress Questionnaire     Feeling of Stress : Not at all   Housing Stability: Low Risk  (7/15/2024)    Housing Stability Vital Sign     Unable to Pay for Housing in the Last Year: No     Homeless in the Last Year: No        Allergies:  Review of patient's allergies indicates:  No Known Allergies     Objective:  Vitals:    07/31/24 1344   BP: 111/72   Pulse: 62   Temp: 98 °F (36.7 °C)     General:  Well developed, well nourished adult female in no acute distress  Abdomen: Soft, nontender, no masses  Extremities:  No clubbing, cyanosis, or edema  Neurologic:  Grossly intact  Musculoskeletal:  Normal tone  :  External genitalia is normal without lesions.  Vagina is normal.       Cystoscopy:         - Urethral meatus:  No strictures        - Urethra:  Normal without strictures or lesions        - Bladder neck:  Normal        - Bladder:  No mucosal abnormalities.  The stent was seen issuing from the left ureteral orifice.  It was grasped and extracted without difficulty.          - Ureteral orifices:  On the trigone with clear efflux bilaterally    The patient tolerated the procedure well without complications.  She was given Cipro 500mg, one tablet in the clinic.   The urethra was anesthetized with 2% Lidocaine Jelly, Urojet.      Assessment:  1. Ureteral stent present  - POCT URINE DIPSTICK WITH MICROSCOPE, AUTOMATED     Plan:  I plan to get a renal scan with Lasix wash-out in two months to confirm that there has been no injury to the ureter.      Follow-up:    Three months after a renal scan.

## 2024-08-13 ENCOUNTER — LAB VISIT (OUTPATIENT)
Dept: LAB | Facility: HOSPITAL | Age: 49
End: 2024-08-13

## 2024-08-13 ENCOUNTER — OFFICE VISIT (OUTPATIENT)
Dept: INTERNAL MEDICINE | Facility: CLINIC | Age: 49
End: 2024-08-13

## 2024-08-13 VITALS
BODY MASS INDEX: 33.18 KG/M2 | SYSTOLIC BLOOD PRESSURE: 123 MMHG | DIASTOLIC BLOOD PRESSURE: 76 MMHG | WEIGHT: 181.38 LBS | HEART RATE: 59 BPM | RESPIRATION RATE: 18 BRPM | OXYGEN SATURATION: 99 % | TEMPERATURE: 98 F

## 2024-08-13 DIAGNOSIS — I82.441 ACUTE DEEP VEIN THROMBOSIS (DVT) OF TIBIAL VEIN OF RIGHT LOWER EXTREMITY: ICD-10-CM

## 2024-08-13 DIAGNOSIS — D64.9 ANEMIA, UNSPECIFIED TYPE: ICD-10-CM

## 2024-08-13 DIAGNOSIS — Z00.00 HEALTHCARE MAINTENANCE: Primary | ICD-10-CM

## 2024-08-13 DIAGNOSIS — Z90.710 H/O TOTAL HYSTERECTOMY: ICD-10-CM

## 2024-08-13 DIAGNOSIS — Z00.00 HEALTHCARE MAINTENANCE: ICD-10-CM

## 2024-08-13 LAB
CHOLEST SERPL-MCNC: 157 MG/DL
CHOLEST/HDLC SERPL: 4 {RATIO} (ref 0–5)
EST. AVERAGE GLUCOSE BLD GHB EST-MCNC: 99.7 MG/DL
HBA1C MFR BLD: 5.1 %
HCV AB SERPL QL IA: NONREACTIVE
HDLC SERPL-MCNC: 39 MG/DL (ref 35–60)
LDLC SERPL CALC-MCNC: 79 MG/DL (ref 50–140)
TRIGL SERPL-MCNC: 196 MG/DL (ref 37–140)
VLDLC SERPL CALC-MCNC: 39 MG/DL

## 2024-08-13 PROCEDURE — 86803 HEPATITIS C AB TEST: CPT

## 2024-08-13 PROCEDURE — 83036 HEMOGLOBIN GLYCOSYLATED A1C: CPT

## 2024-08-13 PROCEDURE — 80061 LIPID PANEL: CPT

## 2024-08-13 PROCEDURE — 36415 COLL VENOUS BLD VENIPUNCTURE: CPT

## 2024-08-13 PROCEDURE — 99214 OFFICE O/P EST MOD 30 MIN: CPT | Mod: PBBFAC

## 2024-08-13 NOTE — PROGRESS NOTES
[facility] INTERNAL MEDICINE  OUTPATIENT OFFICE VISIT NOTE    SUBJECTIVE:    HPI: Eileen Clarke is a 49 y.o. yo female w/ PMH of hysterectomy, DVT post op, anemia, who presents today to establish care.     Today patient only complaint of some left knee pain when walking down stairs for past 3 days. Has not tried OTC meds to help. Is self pay and does not want to pursue further work up for now. Had hysterectomy on 6/13/24 and found to have DVT 6/21/24. Started on xarelto for 3 month period as it is considered provoked post op. Will finish 10/27/24. No other complaints today. Is open to routine lab work and will work on getting insurance.     Patient denies chest pain, palpitations, and shortness of breath.   Patient denies fever, night sweats, chills, nausea, vomiting, diarrhea, constipation, weight loss, and changes in appetite.    Review of Systems:  ROS    12 point review of systems conducted, negative except as stated in the history of present illness. See HPI for details.    OBJECTIVE:    Vitals:   Visit Vitals  /76 (BP Location: Left arm, Patient Position: Sitting, BP Method: Medium (Automatic))   Pulse (!) 59   Temp 98.4 °F (36.9 °C) (Oral)   Resp 18   Wt 82.3 kg (181 lb 6.4 oz)   LMP 05/15/2024 (Approximate)   SpO2 99%   BMI 33.18 kg/m²        Physical Exam  Vitals and nursing note reviewed.   Constitutional:       Appearance: Normal appearance.   HENT:      Head: Normocephalic and atraumatic.      Nose: Nose normal.   Eyes:      Extraocular Movements: Extraocular movements intact.      Conjunctiva/sclera: Conjunctivae normal.      Pupils: Pupils are equal, round, and reactive to light.   Cardiovascular:      Rate and Rhythm: Normal rate and regular rhythm.      Pulses: Normal pulses.      Heart sounds: Normal heart sounds.   Pulmonary:      Effort: Pulmonary effort is normal.      Breath sounds: Normal breath sounds.   Abdominal:      General: Abdomen is flat. Bowel sounds are normal.      Palpations:  "Abdomen is soft.   Musculoskeletal:         General: Normal range of motion.      Cervical back: Normal range of motion and neck supple.   Skin:     General: Skin is warm and dry.      Capillary Refill: Capillary refill takes more than 3 seconds.   Neurological:      General: No focal deficit present.      Mental Status: She is alert and oriented to person, place, and time. Mental status is at baseline.   Psychiatric:         Mood and Affect: Mood normal.         Behavior: Behavior normal.         Thought Content: Thought content normal.         Judgment: Judgment normal.         Lab results:    Chemistry:  Lab Results   Component Value Date     06/16/2024    K 3.5 06/16/2024    BUN 8.5 06/16/2024    CREATININE 0.69 06/16/2024    EGFRNORACEVR >60 06/16/2024    GLUCOSE 95 06/16/2024    CALCIUM 8.2 (L) 06/16/2024    ALKPHOS 50 06/16/2024    LABPROT 5.7 (L) 06/16/2024    ALBUMIN 2.7 (L) 06/16/2024    AST 26 06/16/2024    ALT 14 06/16/2024    TSH 2.576 03/18/2024        Lab Results   Component Value Date    HGBA1C 5.1 08/13/2024        Hematology:  Lab Results   Component Value Date    WBC 7.70 07/26/2024    HGB 11.7 (L) 07/26/2024    HCT 38.0 07/26/2024     07/26/2024       Lipid Panel:  Lab Results   Component Value Date    CHOL 157 08/13/2024    HDL 39 08/13/2024    LDL 79.00 08/13/2024    TRIG 196 (H) 08/13/2024    TOTALCHOLEST 4 08/13/2024        Urine:  No results found for: "COLORUA", "APPEARANCEUA", "SGUA", "PHUA", "PROTEINUA", "GLUCOSEUA", "KETONESUA", "BLOODUA", "NITRITESUA", "LEUKOCYTESUR", "RBCUA", "WBCUA", "BACTERIA", "SQEPUA", "HYALINECASTS", "CREATRANDUR", "PROTEINURINE", "UPROTCREA"       ASSESSMENT & PLAN:    AUB s/p hysterctomy 6/2024  - no complications and no complaints today. Following with gyn    2. Post op DVT 6/2024  - continue xarelto until 10/27/24    3. L knee pain  - try OTC tylenol and advil    4. Anemia   -repeat CBC ordered today to establish baseline   -not currently on iron " therapy     5. Healthcare maintenance   - routine labs and screening ordered      Health Maintenance:  Health Maintenance   Topic Date Due    Mammogram  09/23/2020    Colorectal Cancer Screening  08/12/2025 (Originally 7/21/2022)    TETANUS VACCINE  01/14/2025    Lipid Panel  08/13/2029    Hepatitis C Screening  Completed        Follow up in about 6 months (around 2/13/2025).    Yumiko Pedro MD  Internal Medicine  [facility]

## 2024-08-15 NOTE — PROGRESS NOTES
Attending Addendum:   Patient seen and examined in clinic. Management and Plan were discussed with resident. Care was reasonable and necessary.   Rachelle Espinoza MD  [facility] - Internal Medicine

## 2025-07-29 ENCOUNTER — OFFICE VISIT (OUTPATIENT)
Dept: GYNECOLOGY | Facility: CLINIC | Age: 50
End: 2025-07-29

## 2025-07-29 VITALS
WEIGHT: 192.38 LBS | TEMPERATURE: 98 F | RESPIRATION RATE: 18 BRPM | DIASTOLIC BLOOD PRESSURE: 79 MMHG | BODY MASS INDEX: 35.4 KG/M2 | HEIGHT: 62 IN | OXYGEN SATURATION: 98 % | HEART RATE: 52 BPM | SYSTOLIC BLOOD PRESSURE: 116 MMHG

## 2025-07-29 DIAGNOSIS — Z12.31 ENCOUNTER FOR SCREENING MAMMOGRAM FOR BREAST CANCER: ICD-10-CM

## 2025-07-29 DIAGNOSIS — Z01.419 WELL WOMAN EXAM WITH ROUTINE GYNECOLOGICAL EXAM: Primary | ICD-10-CM

## 2025-07-29 PROCEDURE — 99213 OFFICE O/P EST LOW 20 MIN: CPT | Mod: PBBFAC

## 2025-07-29 PROCEDURE — 99396 PREV VISIT EST AGE 40-64: CPT | Mod: S$PBB,,,

## 2025-07-29 NOTE — PROGRESS NOTES
[facility] -  Gynecology / Women's Health Clinic     Subjective:      Patient ID: Eileen Clarke is a 50 y.o. female.    Chief Complaint:  Well Woman      History of Present Illness:   Jonas #[id number]    The patient  here for annual exam. THALIA/BS/Cystoscopy d/t AUB-L . Denies history of abnormal paps. Last pap 3/18/24-NIL and HPV neg.  MG- 2019 & BIRADS 1. Denies breast or urinary complaints. Denies pelvic pain, abnormal bleeding or discharge. Pt reports no STIs in the past and no concerns. Denies tobacco use. Denies fly hx of breast, ovarian, uterine or colon cancer. PMH DVT after hysterectomy, on Xarelto, stopped d/t no prescription, requesting PCP (no showed to appts hx)    PATHOLOGY 2024:   Uterus, cervix, right and left fallopian tubes, hysterectomy:  -Endometrium:  Secretory phase endometrium  -Myometrium:Leiomyoma with sclerosing fibrosis.  -Cervix:  Mild chronic cervicitis and dilated endocervical glands.  -Right and left fallopian tubes: No significant changes.       GYN & OB History:  Patient's last menstrual period was 05/15/2024 (approximate).   Date of Last Pap: 3/20/2024    OB History    Para Term  AB Living   3 3       SAB IAB Ectopic Multiple Live Births             # Outcome Date GA Lbr Jose/2nd Weight Sex Type Anes PTL Lv   3 Para      Vag-Spont      2 Para      Vag-Spont      1 Para      Vag-Spont          Past Medical History:   Diagnosis Date    Anemia     Class 1 obesity     BMI 33    Deep vein thrombosis (DVT) of lower extremity 07/15/2024        Past Surgical History:   Procedure Laterality Date    HYSTERECTOMY, TOTAL, ABDOMINAL, WITH CYSTOSCOPY N/A 2024    Procedure: HYSTERECTOMY, TOTAL, ABDOMINAL, SALPINGECTOMY, WITH CYSTOSCOPY;  Surgeon: Sarah Barrera MD;  Location: [facility];  Service: OB/GYN;  Laterality: N/A;    URETERAL STENT PLACEMENT Left 2024    Procedure: INSERTION, STENT, URETER;  Surgeon: Holly  "MD Sarah;  Location: [facility];  Service: OB/GYN;  Laterality: Left;        Social History     Tobacco Use    Smoking status: Never     Passive exposure: Never    Smokeless tobacco: Never   Substance and Sexual Activity    Alcohol use: Never    Drug use: Never    Sexual activity: Not Currently     Partners: Male     Birth control/protection: See Surgical Hx        Current Outpatient Medications   Medication Instructions    ferrous sulfate (FEOSOL) 325 mg, Oral, Daily    oxyCODONE (ROXICODONE) 5 mg, Oral, Every 4 hours PRN    rivaroxaban (XARELTO DVT-PE TREAT 30D START) 15 mg (42)- 20 mg (9) tablet dose pack Take 1 tablet (15 mg) by mouth twice daily with food for 21 days followed by 1 tablet (20 mg) by mouth once daily with food       Review of patient's allergies indicates:  No Known Allergies      Review of Systems:  Review of Systems  Negative except for pertinent findings for positives per HPI.     Objective:   Physical Exam   Visit Vitals  /79 (BP Location: Left arm, Patient Position: Sitting)   Pulse (!) 52   Temp 97.8 °F (36.6 °C) (Oral)   Resp 18   Ht 5' 2" (1.575 m)   Wt 87.3 kg (192 lb 6.4 oz)   LMP 05/15/2024 (Approximate)   SpO2 98%   BMI 35.19 kg/m²       GENERAL: Well-developed female in no acute distress.  SKIN: Normal to inspection,warm, dry and intact.  BREASTS: No rashes or erythema. No masses, lumps, discharge, tenderness.  VULVA: General appearance WNL; external genitalia with no lesions or erythema.  BIMANUAL EXAM: Vaginal mucosa/vault atrophic, Vaginal cuff intact. Uterus/Cervix surgically absent. Bilateral adnexa reveal no tenderness.  PSYCHIATRIC: Patient is oriented to person, place, and time. Mood and affect are normal.    Assessment:       ICD-10-CM ICD-9-CM   1. Well woman exam with routine gynecological exam  Z01.419 V72.31   2. Encounter for screening mammogram for breast cancer  Z12.31 V76.12       Plan:     1. Well woman exam with routine gynecological exam    2. Encounter for " screening mammogram for breast cancer  -     Mammo Digital Screening Bilat w/ Doug (XPD); Future; Expected date: 07/29/2025    Pelvic exam today, pap UTD per ACOG recommendations, deferred d/t hysterectomy of benign causes  MG ordered    Encouraged patient to follow up with IM clinic, already established patient  Call with any GYN concerns    Follow up in about 1 year (around 7/29/2026) for Annual.

## (undated) DEVICE — GOWN POLY REINF BRTH SLV XL

## (undated) DEVICE — MANIFOLD 4 PORT

## (undated) DEVICE — KIT SURGICAL TURNOVER

## (undated) DEVICE — BLADE SURG STAINLESS STEEL #10

## (undated) DEVICE — SOL 9P NACL IRR PIC IL

## (undated) DEVICE — PAD PREP CUFFED NS 24X48IN

## (undated) DEVICE — TRAY SKIN SCRUB WET PREMIUM

## (undated) DEVICE — DRAPE UNDERBUTTOCKS PCH STRL

## (undated) DEVICE — PAD ABD 8X10 STERILE

## (undated) DEVICE — GLOVE SIGNATURE ESSNTL LTX 6.5

## (undated) DEVICE — COVER TABLE HVY DTY 60X90IN

## (undated) DEVICE — SPONGE LAP 18X18 PREWASHED

## (undated) DEVICE — HEMOSTAT SURGICEL PWD 3G

## (undated) DEVICE — GLOVE SENSICARE PI GRN 7

## (undated) DEVICE — DRAPE LEGGINGS CUFF 31X48IN

## (undated) DEVICE — SUT SILK 0 SUTUPAK SA86H

## (undated) DEVICE — CONTAINER SPECIMEN OR STER 4OZ

## (undated) DEVICE — GLOVE SIGNATURE ESSNTL LTX 7

## (undated) DEVICE — SCRUB DYNA-HEX LIQ 4% CHG 4OZ

## (undated) DEVICE — SUT 2/0 30IN SILK BLK BRAI

## (undated) DEVICE — SOLIDIFIER BOTTLE 1500CC

## (undated) DEVICE — STAPLER SKIN ROTATING HEAD

## (undated) DEVICE — GUIDEWIRE STD .035X180CM STR

## (undated) DEVICE — Device

## (undated) DEVICE — JELLY SURGILUBE LUBE PKT 3GM

## (undated) DEVICE — SUT 2-0 VICRYL / SH (J417)

## (undated) DEVICE — SUT J286G 2-0 VICRYL

## (undated) DEVICE — DRAPE C-ARM COVER EZ 36X28IN

## (undated) DEVICE — SUT CHROMIC 3-0 SH 27IN GUT

## (undated) DEVICE — TUBING MEDI-VAC 20FT .25IN

## (undated) DEVICE — GLOVE SENSICARE PI GRN 6

## (undated) DEVICE — GLOVE SIGNATURE MICRO LTX 6.5

## (undated) DEVICE — APPLICATOR CHLORAPREP ORN 26ML

## (undated) DEVICE — DEVICE ENSEAL X1 LARGE JAW

## (undated) DEVICE — GLOVE SENSICARE PI GRN 6.5

## (undated) DEVICE — COVER MAYO STAND REINFRCD 30

## (undated) DEVICE — TRAY CATH FOL SIL URIMTR 16FR

## (undated) DEVICE — PAD CURAD NONADH 3X4IN

## (undated) DEVICE — SET CYSTO IRR DRP CHMBR 84IN

## (undated) DEVICE — GLOVE PROTEXIS HYDROGEL SZ7.5

## (undated) DEVICE — SUT JJ41G O VICRYL

## (undated) DEVICE — SUT 1 48IN PDS II VIO MONO

## (undated) DEVICE — SUT 1 18IN COATED VICRYL UN

## (undated) DEVICE — TOWEL OR DISP STRL BLUE 4/PK

## (undated) DEVICE — CATH POLLACK OPEN-END FLEXI-TI

## (undated) DEVICE — DRAPE INCISE IOBAN 2 23X17IN

## (undated) DEVICE — SUT SA85H SILK 2-0

## (undated) DEVICE — ELECTRODE EXTENDED BLADE

## (undated) DEVICE — DRAPE FULL SHEET 70X100IN

## (undated) DEVICE — SYR 10CC LUER LOCK

## (undated) DEVICE — SOL NORMAL USPCA 0.9%

## (undated) DEVICE — SUT 0 8-27IN VICRYL PL CT-1